# Patient Record
Sex: FEMALE | Race: WHITE | NOT HISPANIC OR LATINO | Employment: FULL TIME | URBAN - METROPOLITAN AREA
[De-identification: names, ages, dates, MRNs, and addresses within clinical notes are randomized per-mention and may not be internally consistent; named-entity substitution may affect disease eponyms.]

---

## 2018-02-07 NOTE — PROGRESS NOTES
Negrita [facility] COLLETTE [facility]CGREGOR [facility]  FAMILY PRACTICE OFFICE VISIT       NAME: Harish Garner  AGE: 50 y o  SEX: female       : 1965        MRN: [id number]    DATE: 2018  TIME: 7:01 PM    Assessment and Plan     Problem List Items Addressed This Visit     Hypertension, essential - Primary       Reviewed that her blood pressure elevated today  She states that she has been diagnosed with white coat syndrome in the past  The patient will continue to monitor her blood pressure at home  If she is getting readings over 140/90, she should give us a  For now she will continue with hydrochlorothiazide 25 milligrams daily  We will plan to reassess this in 2 months  Relevant Medications    hydrochlorothiazide (HYDRODIURIL) 25 mg tablet    Other Relevant Orders    TSH, 3rd generation with T4 reflex    Impaired fasting blood sugar       We will reassess with the labs are ordered today  She will continue with her metformin start taking it twice daily again  She will continue her carbohydrate intake  She will be able to start exercising regular basis future as well  Relevant Orders    Comprehensive metabolic panel    Lipid Panel with Direct LDL reflex    TSH, 3rd generation with T4 reflex    Hemoglobin A1c    Microalbumin, Random Urine (W/Creatinine)    Mild intermittent asthma without complication       Stable Symbicort daily as Proventil as needed         Relevant Medications    albuterol (PROVENTIL HFA,VENTOLIN HFA) 90 mcg/act inhaler    budesonide-formoterol (SYMBICORT) 80-4 5 MCG/ACT inhaler    Depression       The patient is hesitant to start medication for depression at this time  She was given a script Wellbutrin to hold onto in think about starting  She was additionally referred to Psychology  She will follow up in 2 months for recheck           Relevant Medications    buPROPion (WELLBUTRIN XL) 150 mg 24 hr tablet    Other Relevant Orders    Ambulatory referral to Psychology Gastroesophageal reflux disease without esophagitis       Controlled with pantoprazole 20 daily  She was encouraged to try to decrease her use to every other day if tolerated  Relevant Medications    pantoprazole (PROTONIX) 20 mg tablet    Other Relevant Orders    Magnesium    Ventral hernia without obstruction or gangrene       The patient a large ventral hernia the epigastric region  She was referred to Dr Monserrat Davis for correction of this  She was encouraged to avoid any lifting activities or crutches  If she develops any severe and persistent pain, she should go to the emergency room for further immediate evaluation  Relevant Orders    Ambulatory referral to General Surgery    Anemia       Previously on iron -  appears that it was likely due to menorrhagia as blood loss during her hysterectomy -  Reassess with labs as above  Relevant Orders    CBC and differential    H/O: hysterectomy      The patient has been experiencing flashes here to  She was referred to gynecology for follow-up status post her hysterectomy as as to discuss possible treatment options for these significant flashes  Relevant Orders    Ambulatory referral to Gynecology    Hot flashes      The patient has been experiencing flashes here to  She was referred to gynecology for follow-up status post her hysterectomy as as to discuss possible treatment options for these significant flashes  Gastroesophageal reflux disease without esophagitis    Controlled with pantoprazole 20 daily  She was encouraged to try to decrease her use to every other day if tolerated  Impaired fasting blood sugar    We will reassess with the labs are ordered today  She will continue with her metformin start taking it twice daily again  She will continue her carbohydrate intake  She will be able to start exercising regular basis future as well      Mild intermittent asthma without complication    Stable Symbicort daily as Proventil as needed    Hypertension, essential    Reviewed that her blood pressure elevated today  She states that she has been diagnosed with white coat syndrome in the past  The patient will continue to monitor her blood pressure at home  If she is getting readings over 140/90, she should give us a  For now she will continue with hydrochlorothiazide 25 milligrams daily  We will plan to reassess this in 2 months  Depression    The patient is hesitant to start medication for depression at this time  She was given a script Wellbutrin to hold onto in think about starting  She was additionally referred to Psychology  She will follow up in 2 months for recheck  Ventral hernia without obstruction or gangrene    The patient a large ventral hernia the epigastric region  She was referred to Dr Cristina Rios for correction of this  She was encouraged to avoid any lifting activities or crutches  If she develops any severe and persistent pain, she should go to the emergency room for further immediate evaluation  Hot flashes   The patient has been experiencing flashes here to  She was referred to gynecology for follow-up status post her hysterectomy as as to discuss possible treatment options for these significant flashes  H/O: hysterectomy   The patient has been experiencing flashes here to  She was referred to gynecology for follow-up status post her hysterectomy as as to discuss possible treatment options for these significant flashes  Anemia    Previously on iron -  appears that it was likely due to menorrhagia as blood loss during her hysterectomy -  Reassess with labs as above  Chief Complaint     Chief Complaint   Patient presents with    Establish Care     new pt     Hernia     noticed in the summer        History of Present Illness   Brady Giang is a 50y o -year-old female who presents for to establish care       The patient notes that she had a hysterectomy in 2016 and diagnosed with hernia but had no coverage  She needs referral to surgeon  She has a constant discomfort there and bulging  She does flights of stairs daily but feels this makes her short of breath  She has daily diarrhea for a few months but no blood noted  She notes that she has a lot of hot flashes and needs a GYN  She notes that she has PCOS which resulted in insulin resistance  She notes that her last A1c was 6 1% - notes that was before her metformin was started  She was on it twice daily but has only been on it once daily for the last few months  She has had no labs since 2016  She has been on HCTZ 25 mg daily for a while for HTN and usually 110/70 to 140/80 at home  She is controlled with the pantoprazole 20 mg daily - has had EGD in the past (last was 5-7 years ago) and has had gastritis and ulcers    She notes that she often is anemic  She was taking iron and vitamin C and felt amazing  She ran out of Colace so had to stop it about 10 months  She has asthma and uses Symbicort once daily (more in the summer) and Proventil about 1-3 times a month  Patient also notes that she has had a very rough past year  She states that she had put her cat down shortly after having hysterectomy  Then her mother   About 6 months later her father   Then she also to put down her other cat  She has been struggling with depression and having some suicidal thoughts  She does not feel that she would act on these thoughts it is even bothering her where she is dreaming about it  He is been antidepressants she trouble serotonin syndrome Lexapro  She does not want to take any medications that affect serotonin or any controlled substances such as Xanax  Review of Systems   Review of Systems   Respiratory: Negative for cough, shortness of breath and wheezing  Cardiovascular: Negative for chest pain, palpitations and leg swelling  Gastrointestinal: Positive for abdominal pain  Negative for constipation, diarrhea, nausea and vomiting  No heartburn   Endocrine:        Hot flashes   Genitourinary: Negative for dysuria  Musculoskeletal: Negative for arthralgias and myalgias  Skin: Negative for rash  Neurological: Negative for dizziness and headaches  Hematological: Does not bruise/bleed easily  Psychiatric/Behavioral: Positive for dysphoric mood and suicidal ideas (but no plan or intent)  The patient is nervous/anxious  Active Problem List     Patient Active Problem List   Diagnosis    Hypertension, essential    Hypokalemia    Impaired fasting blood sugar    Mild intermittent asthma without complication    Depression    Gastroesophageal reflux disease without esophagitis    Ventral hernia without obstruction or gangrene    Anemia    H/O: hysterectomy    Hot flashes         Past Medical History:  Past Medical History:   Diagnosis Date    Hx of hysterectomy        Past Surgical History:  Past Surgical History:   Procedure Laterality Date    HYSTERECTOMY      SINUS SURGERY      sinus and septum deviation correction and adenoids were removed    WISDOM TOOTH EXTRACTION         Family History:  Family History   Problem Relation Age of Onset    Hypertension Mother    [place] Stroke Mother     Depression Mother     Anxiety disorder Mother     Glaucoma Mother     Cancer Father      kidney and stomach    Hypertension Father     Depression Father     Anxiety disorder Father     Glaucoma Father     Diabetes Sister     Anxiety disorder Sister     Heart attack Brother     Hypertension Brother        Social History:  Social History     Social History    Marital status: Unknown     Spouse name: N/A    Number of children: N/A    Years of education: N/A     Occupational History    Not on file       Social History Main Topics    Smoking status: Never Smoker    Smokeless tobacco: Never Used    Alcohol use No    Drug use: No    Sexual activity: Not on file Other Topics Concern    Not on file     Social History Narrative    No narrative on file       Objective     Vitals:    02/08/18 1737   Pulse: 70   Resp: 18   Temp: 98 1 °F (36 7 °C)     Wt Readings from Last 3 Encounters:   02/08/18 81 6 kg (180 lb)       Physical Exam   Constitutional: She appears well-developed and well-nourished  HENT:   Head: Normocephalic and atraumatic  Right Ear: Hearing, tympanic membrane, external ear and ear canal normal    Left Ear: Hearing, tympanic membrane, external ear and ear canal normal    Nose: Nose normal    Mouth/Throat: Oropharynx is clear and moist and mucous membranes are normal    Eyes: Conjunctivae and lids are normal  Pupils are equal, round, and reactive to light  Neck: Trachea normal and normal range of motion  Neck supple  Carotid bruit is not present  No thyroid mass and no thyromegaly present  Cardiovascular: Normal rate, regular rhythm, S1 normal, S2 normal, normal heart sounds and intact distal pulses  No murmur heard  Pulses:       Radial pulses are 2+ on the right side, and 2+ on the left side  Posterior tibial pulses are 2+ on the right side, and 2+ on the left side  Pulmonary/Chest: Effort normal and breath sounds normal  She has no decreased breath sounds  She has no wheezes  She has no rhonchi  She has no rales  Abdominal: Soft  Normal appearance and bowel sounds are normal  She exhibits no distension and no mass  There is no hepatosplenomegaly  There is tenderness in the right lower quadrant and left upper quadrant  There is no rigidity and no guarding  A hernia is present  Hernia confirmed positive in the ventral area (13 cm x 7 cm in epigastric region)  Musculoskeletal: Normal range of motion  Lymphadenopathy:     She has no cervical adenopathy  Neurological: She is alert  She has normal strength  No sensory deficit (light touch sensation intact and equal in UE and LE bilaterally)  Skin: Skin is warm and dry   No rash noted  Psychiatric: She has a normal mood and affect  Her behavior is normal    Vitals reviewed        Orders Placed This Encounter   Procedures    CBC and differential    Comprehensive metabolic panel    Lipid Panel with Direct LDL reflex    TSH, 3rd generation with T4 reflex    Hemoglobin A1c    Microalbumin, Random Urine (W/Creatinine)    Magnesium    Ambulatory referral to Gynecology    Ambulatory referral to General Surgery    Ambulatory referral to Psychology       ALLERGIES:  Allergies   Allergen Reactions    Zolpidem Anxiety    Escitalopram Other (See Comments)     Hallucinations    Latex      Swelling, irritation    Seasonal Ic [Cholestatin]        Current Medications     Current Outpatient Prescriptions   Medication Sig Dispense Refill    albuterol (PROVENTIL HFA,VENTOLIN HFA) 90 mcg/act inhaler Inhale 2 puffs every 6 (six) hours as needed      budesonide-formoterol (SYMBICORT) 80-4 5 MCG/ACT inhaler Inhale 2 puffs 2 (two) times a day      hydrochlorothiazide (HYDRODIURIL) 25 mg tablet Take 1 tablet (25 mg total) by mouth daily 30 tablet 5    metFORMIN (GLUCOPHAGE) 500 mg tablet Take 500 mg by mouth 2 (two) times a day with meals      pantoprazole (PROTONIX) 20 mg tablet Take 1 tablet (20 mg total) by mouth daily 30 tablet 5    zinc gluconate 50 mg tablet Take 50 mg by mouth daily      acetaminophen (TYLENOL) 500 mg tablet Take 500 mg by mouth every 8 (eight) hours      Ascorbic Acid, Vitamin C, (VITAMIN C) 100 MG tablet Take 250 mg by mouth      budesonide-formoterol (SYMBICORT) 80-4 5 MCG/ACT inhaler Inhale 2 puffs      buPROPion (WELLBUTRIN XL) 150 mg 24 hr tablet Take 1 tablet (150 mg total) by mouth every morning 30 tablet 1    docusate sodium (COLACE) 100 mg capsule Take 100 mg by mouth      ferrous sulfate 325 (65 Fe) mg tablet Take 325 mg by mouth      hydrochlorothiazide (HYDRODIURIL) 25 mg tablet TAKE ONE TABLET BY MOUTH ONE TIME DAILY      LORazepam (ATIVAN) 0 5 mg tablet Take 0 5 mg by mouth every 6 (six) hours      pantoprazole (PROTONIX) 20 mg tablet TAKE ONE TABLET BY MOUTH ONE TIME DAILY      PSEUDOEPHEDRINE SULFATE PO Take by mouth      traMADol (ULTRAM) 50 mg tablet Take 50 mg by mouth every 4 (four) hours       No current facility-administered medications for this visit  Health Maintenance   There are no preventive care reminders to display for this patient    Immunization History   Administered Date(s) Administered    Pneumococcal Polysaccharide PPV23 08/04/2016       Bhupendra Farmer PA-C  2/8/2018 7:01 PM  [facility]

## 2018-02-08 ENCOUNTER — OFFICE VISIT (OUTPATIENT)
Dept: FAMILY MEDICINE CLINIC | Facility: CLINIC | Age: 53
End: 2018-02-08
Payer: COMMERCIAL

## 2018-02-08 VITALS
DIASTOLIC BLOOD PRESSURE: 92 MMHG | HEART RATE: 70 BPM | SYSTOLIC BLOOD PRESSURE: 150 MMHG | TEMPERATURE: 98.1 F | WEIGHT: 180 LBS | RESPIRATION RATE: 18 BRPM | HEIGHT: 63 IN | BODY MASS INDEX: 31.89 KG/M2

## 2018-02-08 DIAGNOSIS — Z90.710 H/O: HYSTERECTOMY: ICD-10-CM

## 2018-02-08 DIAGNOSIS — R23.2 HOT FLASHES: ICD-10-CM

## 2018-02-08 DIAGNOSIS — F32.A DEPRESSION, UNSPECIFIED DEPRESSION TYPE: ICD-10-CM

## 2018-02-08 DIAGNOSIS — K21.9 GASTROESOPHAGEAL REFLUX DISEASE WITHOUT ESOPHAGITIS: ICD-10-CM

## 2018-02-08 DIAGNOSIS — I10 HYPERTENSION, ESSENTIAL: Primary | ICD-10-CM

## 2018-02-08 DIAGNOSIS — R73.01 IMPAIRED FASTING BLOOD SUGAR: ICD-10-CM

## 2018-02-08 DIAGNOSIS — K43.9 VENTRAL HERNIA WITHOUT OBSTRUCTION OR GANGRENE: ICD-10-CM

## 2018-02-08 DIAGNOSIS — D64.9 ANEMIA, UNSPECIFIED TYPE: ICD-10-CM

## 2018-02-08 DIAGNOSIS — J45.20 MILD INTERMITTENT ASTHMA WITHOUT COMPLICATION: ICD-10-CM

## 2018-02-08 PROCEDURE — 3725F SCREEN DEPRESSION PERFORMED: CPT | Performed by: PHYSICIAN ASSISTANT

## 2018-02-08 PROCEDURE — 99214 OFFICE O/P EST MOD 30 MIN: CPT | Performed by: PHYSICIAN ASSISTANT

## 2018-02-08 RX ORDER — TRAMADOL HYDROCHLORIDE 50 MG/1
50 TABLET ORAL EVERY 4 HOURS
COMMUNITY
Start: 2016-09-14 | End: 2018-02-22

## 2018-02-08 RX ORDER — PANTOPRAZOLE SODIUM 20 MG/1
20 TABLET, DELAYED RELEASE ORAL DAILY
COMMUNITY
End: 2018-02-08

## 2018-02-08 RX ORDER — ACETAMINOPHEN 500 MG
500-1000 TABLET ORAL EVERY 6 HOURS PRN
COMMUNITY
Start: 2016-09-14 | End: 2018-06-25

## 2018-02-08 RX ORDER — FERROUS SULFATE 325(65) MG
325 TABLET ORAL
COMMUNITY
Start: 2016-09-15 | End: 2018-02-22

## 2018-02-08 RX ORDER — ZINC GLUCONATE 50 MG
50 TABLET ORAL DAILY
COMMUNITY
End: 2018-03-13

## 2018-02-08 RX ORDER — BUPROPION HYDROCHLORIDE 150 MG/1
150 TABLET ORAL EVERY MORNING
Qty: 30 TABLET | Refills: 1 | Status: SHIPPED | OUTPATIENT
Start: 2018-02-08 | End: 2018-02-22

## 2018-02-08 RX ORDER — HYDROCHLOROTHIAZIDE 25 MG/1
TABLET ORAL
COMMUNITY
Start: 2017-01-04 | End: 2018-02-16 | Stop reason: SDUPTHER

## 2018-02-08 RX ORDER — BUDESONIDE AND FORMOTEROL FUMARATE DIHYDRATE 80; 4.5 UG/1; UG/1
2 AEROSOL RESPIRATORY (INHALATION) DAILY PRN
COMMUNITY
Start: 2016-08-04

## 2018-02-08 RX ORDER — LORAZEPAM 0.5 MG/1
0.5 TABLET ORAL EVERY 6 HOURS
COMMUNITY
End: 2018-02-22

## 2018-02-08 RX ORDER — BUDESONIDE AND FORMOTEROL FUMARATE DIHYDRATE 80; 4.5 UG/1; UG/1
2 AEROSOL RESPIRATORY (INHALATION) 2 TIMES DAILY
COMMUNITY
End: 2018-02-16 | Stop reason: SDUPTHER

## 2018-02-08 RX ORDER — HYDROCHLOROTHIAZIDE 25 MG/1
25 TABLET ORAL DAILY
COMMUNITY
End: 2018-02-08

## 2018-02-08 RX ORDER — DOCUSATE SODIUM 100 MG/1
100 CAPSULE, LIQUID FILLED ORAL
COMMUNITY
Start: 2016-09-15 | End: 2018-02-22

## 2018-02-08 RX ORDER — PANTOPRAZOLE SODIUM 20 MG/1
20 TABLET, DELAYED RELEASE ORAL DAILY
Qty: 30 TABLET | Refills: 5 | Status: SHIPPED | OUTPATIENT
Start: 2018-02-08 | End: 2018-08-16 | Stop reason: SDUPTHER

## 2018-02-08 RX ORDER — HYDROCHLOROTHIAZIDE 25 MG/1
25 TABLET ORAL DAILY
Qty: 30 TABLET | Refills: 5 | Status: SHIPPED | OUTPATIENT
Start: 2018-02-08 | End: 2018-08-16 | Stop reason: SDUPTHER

## 2018-02-08 RX ORDER — ALBUTEROL SULFATE 90 UG/1
2 AEROSOL, METERED RESPIRATORY (INHALATION) EVERY 6 HOURS PRN
COMMUNITY

## 2018-02-08 RX ORDER — PANTOPRAZOLE SODIUM 20 MG/1
TABLET, DELAYED RELEASE ORAL
COMMUNITY
Start: 2017-01-04 | End: 2018-02-16 | Stop reason: SDUPTHER

## 2018-02-08 RX ORDER — METFORMIN HYDROCHLORIDE 500 MG/1
500 TABLET, FILM COATED, EXTENDED RELEASE ORAL 2 TIMES DAILY WITH MEALS
COMMUNITY
End: 2018-02-08

## 2018-02-08 NOTE — ASSESSMENT & PLAN NOTE
We will reassess with the labs are ordered today  She will continue with her metformin start taking it twice daily again  She will continue her carbohydrate intake  She will be able to start exercising regular basis future as well

## 2018-02-08 NOTE — ASSESSMENT & PLAN NOTE
Controlled with pantoprazole 20 daily  She was encouraged to try to decrease her use to every other day if tolerated

## 2018-02-08 NOTE — ASSESSMENT & PLAN NOTE
The patient is hesitant to start medication for depression at this time  She was given a script Wellbutrin to hold onto in think about starting  She was additionally referred to Psychology  She will follow up in 2 months for recheck

## 2018-02-08 NOTE — ASSESSMENT & PLAN NOTE
Reviewed that her blood pressure elevated today  She states that she has been diagnosed with white coat syndrome in the past  The patient will continue to monitor her blood pressure at home  If she is getting readings over 140/90, she should give us a  For now she will continue with hydrochlorothiazide 25 milligrams daily  We will plan to reassess this in 2 months

## 2018-02-09 NOTE — ASSESSMENT & PLAN NOTE
The patient a large ventral hernia the epigastric region  She was referred to Dr Monserrat Davis for correction of this  She was encouraged to avoid any lifting activities or crutches  If she develops any severe and persistent pain, she should go to the emergency room for further immediate evaluation

## 2018-02-09 NOTE — PATIENT INSTRUCTIONS
Gastroesophageal reflux disease without esophagitis    Controlled with pantoprazole 20 daily  She was encouraged to try to decrease her use to every other day if tolerated  Impaired fasting blood sugar    We will reassess with the labs are ordered today  She will continue with her metformin start taking it twice daily again  She will continue her carbohydrate intake  She will be able to start exercising regular basis future as well  Mild intermittent asthma without complication    Stable Symbicort daily as Proventil as needed    Hypertension, essential    Reviewed that her blood pressure elevated today  She states that she has been diagnosed with white coat syndrome in the past  The patient will continue to monitor her blood pressure at home  If she is getting readings over 140/90, she should give us a  For now she will continue with hydrochlorothiazide 25 milligrams daily  We will plan to reassess this in 2 months  Depression    The patient is hesitant to start medication for depression at this time  She was given a script Wellbutrin to hold onto in think about starting  She was additionally referred to Psychology  She will follow up in 2 months for recheck  Ventral hernia without obstruction or gangrene    The patient a large ventral hernia the epigastric region  She was referred to Dr Yaa Valdes for correction of this  She was encouraged to avoid any lifting activities or crutches  If she develops any severe and persistent pain, she should go to the emergency room for further immediate evaluation  Hot flashes   The patient has been experiencing flashes here to  She was referred to gynecology for follow-up status post her hysterectomy as as to discuss possible treatment options for these significant flashes  H/O: hysterectomy   The patient has been experiencing flashes here to    She was referred to gynecology for follow-up status post her hysterectomy as as to discuss possible treatment options for these significant flashes  Anemia    Previously on iron -  appears that it was likely due to menorrhagia as blood loss during her hysterectomy -  Reassess with labs as above

## 2018-02-09 NOTE — ASSESSMENT & PLAN NOTE
The patient has been experiencing flashes here to  She was referred to gynecology for follow-up status post her hysterectomy as as to discuss possible treatment options for these significant flashes

## 2018-02-09 NOTE — ASSESSMENT & PLAN NOTE
Previously on iron -  appears that it was likely due to menorrhagia as blood loss during her hysterectomy -  Reassess with labs as above

## 2018-02-16 ENCOUNTER — OFFICE VISIT (OUTPATIENT)
Dept: SURGERY | Facility: CLINIC | Age: 53
End: 2018-02-16
Payer: COMMERCIAL

## 2018-02-16 VITALS
BODY MASS INDEX: 31.71 KG/M2 | RESPIRATION RATE: 18 BRPM | HEART RATE: 72 BPM | DIASTOLIC BLOOD PRESSURE: 78 MMHG | SYSTOLIC BLOOD PRESSURE: 142 MMHG | WEIGHT: 179 LBS | HEIGHT: 63 IN | TEMPERATURE: 98.2 F

## 2018-02-16 DIAGNOSIS — K43.9 VENTRAL HERNIA WITHOUT OBSTRUCTION OR GANGRENE: ICD-10-CM

## 2018-02-16 DIAGNOSIS — K43.2 INCISIONAL HERNIA, WITHOUT OBSTRUCTION OR GANGRENE: Primary | ICD-10-CM

## 2018-02-16 PROCEDURE — 99204 OFFICE O/P NEW MOD 45 MIN: CPT | Performed by: SURGERY

## 2018-02-16 NOTE — LETTER
February 16, 2018     Neida Anthony PA-C  [address]  [facility]  [address]    Patient: Brady Giang   YOB: 1965   Date of Visit: 2/16/2018       Dear Dr Meenakshi Shetty: Thank you for referring Brady Giang to me for evaluation  Below are my notes for this consultation  If you have questions, please do not hesitate to call me  I look forward to following your patient along with you  Sincerely,        Ruslan Brooks MD        CC: MD Zabrina Estrella PA-C  2/16/2018  8:46 AM  Cosign Needed  Assessment/Plan:   Brady Giang is a 50 y  o female who is here for The encounter diagnosis was Ventral hernia without obstruction or gangrene  Incisional Hernia    82-year-old female status post total hysterectomy approximately year ago with bulge above incision and pain associated with this  Denies change in bowel habits but alternates between diarrhea and constipation    On physical exam first there is a ventral hernia above the umbilicus and previous scar  Plan: laparoscopic repair of Incisional Ventral Hernia  Will obtain a CT scan of abdomen pelvis  Discussed need for screening colonoscopy        Preoperative Clearance: none          Imaging: No new pertinent imaging studies  Patient was never admitted  _____________________________________________      HPI:  Brady Giang is a 50 y  o female who was referred for evaluation of Hernia (ventral)        Currently complaining of initial Incisional Ventral Hernia  worse with bending, lifting, standing, walking,   no nausea and no vomiting,  Bowel habits fluctuate between constipation and diarrhea  Constant and over 6 months      Prior abdominal surgery: total hysterectomy        ROS:  General ROS: negative  negative for - chills, fatigue, fever or night sweats, weight loss  Respiratory ROS: no cough, shortness of breath, or wheezing  Cardiovascular ROS: no chest pain or dyspnea on exertion  Genito-Urinary ROS: no dysuria, trouble voiding, or hematuria  Musculoskeletal ROS: negative for - gait disturbance, joint pain or muscle pain  Neurological ROS: no TIA or stroke symptoms  Abdominal ROS: see HPI  GI ROS: see HPI  Skin ROS: no new rashes or lesions   Lymphatic ROS: no new adenopathy noted by pt     GYN ROS: see HPI, no new GYN history or bleeding noted  Psy ROS: no new mental or behavioral disturbances       Patient Active Problem List   Diagnosis    Hypertension, essential    Hypokalemia    Impaired fasting blood sugar    Mild intermittent asthma without complication    Depression    Gastroesophageal reflux disease without esophagitis    Ventral hernia without obstruction or gangrene    Anemia    H/O: hysterectomy    Hot flashes         Allergies: Zolpidem; Escitalopram; Latex; and Seasonal ic [cholestatin]    Meds:  Current Outpatient Prescriptions:     albuterol (PROVENTIL HFA,VENTOLIN HFA) 90 mcg/act inhaler, Inhale 2 puffs every 6 (six) hours as needed, Disp: , Rfl:     budesonide-formoterol (SYMBICORT) 80-4 5 MCG/ACT inhaler, Inhale 2 puffs, Disp: , Rfl:     hydrochlorothiazide (HYDRODIURIL) 25 mg tablet, Take 1 tablet (25 mg total) by mouth daily, Disp: 30 tablet, Rfl: 5    metFORMIN (GLUCOPHAGE) 500 mg tablet, Take 500 mg by mouth 2 (two) times a day with meals, Disp: , Rfl:     pantoprazole (PROTONIX) 20 mg tablet, Take 1 tablet (20 mg total) by mouth daily, Disp: 30 tablet, Rfl: 5    zinc gluconate 50 mg tablet, Take 50 mg by mouth daily, Disp: , Rfl:     acetaminophen (TYLENOL) 500 mg tablet, Take 500 mg by mouth every 8 (eight) hours, Disp: , Rfl:     Ascorbic Acid, Vitamin C, (VITAMIN C) 100 MG tablet, Take 250 mg by mouth, Disp: , Rfl:     buPROPion (WELLBUTRIN XL) 150 mg 24 hr tablet, Take 1 tablet (150 mg total) by mouth every morning, Disp: 30 tablet, Rfl: 1    docusate sodium (COLACE) 100 mg capsule, Take 100 mg by mouth, Disp: , Rfl:     ferrous sulfate 325 (65 Fe) mg tablet, Take 325 mg by mouth, Disp: , Rfl:     LORazepam (ATIVAN) 0 5 mg tablet, Take 0 5 mg by mouth every 6 (six) hours, Disp: , Rfl:     PSEUDOEPHEDRINE SULFATE PO, Take by mouth, Disp: , Rfl:     traMADol (ULTRAM) 50 mg tablet, Take 50 mg by mouth every 4 (four) hours, Disp: , Rfl:     PMH:  Past Medical History:   Diagnosis Date    [place]     Asthma     Depression     Diabetes ([address] )     GERD (gastroesophageal reflux disease)     Hx of hysterectomy     Hypertension        PSH:  Past Surgical History:   Procedure Laterality Date    HYSTERECTOMY      SINUS SURGERY      sinus and septum deviation correction and adenoids were removed    WISDOM TOOTH EXTRACTION         Family History   Problem Relation Age of Onset    Hypertension Mother     Stroke Mother     Depression Mother     Anxiety disorder Mother     Glaucoma Mother     Cancer Father      kidney and stomach    Hypertension Father     Depression Father     Anxiety disorder Father     Glaucoma Father     Diabetes Sister     Anxiety disorder Sister     Heart attack Brother     Hypertension Brother         reports that she has never smoked  She has never used smokeless tobacco  She reports that she does not drink alcohol or use drugs  PHYSICAL EXAM  General Appearance:    Alert, cooperative, no distress, NAD   Head:    Normocephalic without obvious abnormality   Eyes:    PERRL, conjunctiva/corneas clear, EOM's intact        Neck:   Supple, no adenopathy, no JVD   Back:     Symmetric, no spinal or CVA tenderness   Lungs:     Clear to auscultation bilaterally, no wheezing or rhonchi   Heart:    Regular rate and rhythm, S1 and S2 normal, no murmur   Abdomen:      moderate tenderness in the supraumbilical  Bowel sounds are normal     Ventral Incisional Hernia  The hernia is, easily reducible,, The fascial edges are easily palpable      Extremities:   Extremities normal  No clubbing, cyanosis or edema   Psych: Normal Affect, AOx3  Neurologic:  Skin:   CNII-XII intact  Strength symmetric, speech intact    Warm, dry, intact, no visible rashes or lesions                   Informed consent for procedure was personally discussed, reviewed, and signed by Dr Adina Herbert  Discussion by Dr Adina Herbert was carried out regarding risks, benefits, and alternatives with the patient  Risks include but are not limited to:  bleeding, infection, and delayed wound healing or an open wound, pulmonary embolus, leaks from bowel or bile ducts or other viscus, transfusions, death  Discussed in further detail the more common complications and their rates of occurrence   was used if necessary  Patient expressed understanding of the issues discussed and wished/consented to proceed  All questions were answered by Dr Adina Herbert  Discussion performed between patient and the provider signing below  Signature:   Charmayne Honey, MD    Date: 2/16/2018 Time: 8:35 AM       Some portions of this record may have been generated with voice recognition software  There may be translation, syntax,  or grammatical errors  Occasional wrong word or "sound-a-like" substitutions may have occurred due to the inherent limitations of the voice recognition software  Read the chart carefully and recognize, using context, where substitutions may have occurred  If you have any questions, please contact the dictating provider for clarification or correction, as needed  This encounter has been coded by a non-certified coder

## 2018-02-16 NOTE — PROGRESS NOTES
Assessment/Plan:   Edmond Kulkarni is a 50 y  o female who is here for The encounter diagnosis was Ventral hernia without obstruction or gangrene  Incisional Hernia    53-year-old female status post total hysterectomy approximately year ago with bulge above incision and pain associated with this  Denies change in bowel habits but alternates between diarrhea and constipation    On physical exam first there is a ventral hernia above the umbilicus and previous scar  Plan: laparoscopic repair of Incisional Ventral Hernia  Will obtain a CT scan of abdomen pelvis  Discussed need for screening colonoscopy        Preoperative Clearance: none          Imaging: No new pertinent imaging studies  Patient was never admitted  _____________________________________________      HPI:  Edmond Kulkarni is a 50 y  o female who was referred for evaluation of Hernia (ventral)    Currently complaining of initial Incisional Ventral Hernia  worse with bending, lifting, standing, walking,   no nausea and no vomiting,  Bowel habits fluctuate between constipation and diarrhea  Constant and over 6 months      Prior abdominal surgery: total hysterectomy        ROS:  General ROS: negative  negative for - chills, fatigue, fever or night sweats, weight loss  Respiratory ROS: no cough, shortness of breath, or wheezing  Cardiovascular ROS: no chest pain or dyspnea on exertion  Genito-Urinary ROS: no dysuria, trouble voiding, or hematuria  Musculoskeletal ROS: negative for - gait disturbance, joint pain or muscle pain  Neurological ROS: no TIA or stroke symptoms  Abdominal ROS: see HPI  GI ROS: see HPI  Skin ROS: no new rashes or lesions   Lymphatic ROS: no new adenopathy noted by pt     GYN ROS: see HPI, no new GYN history or bleeding noted  Psy ROS: no new mental or behavioral disturbances       Patient Active Problem List   Diagnosis    Hypertension, essential    Hypokalemia    Impaired fasting blood sugar    Mild intermittent asthma without complication    Depression    Gastroesophageal reflux disease without esophagitis    Ventral hernia without obstruction or gangrene    Anemia    H/O: hysterectomy    Hot flashes         Allergies: Zolpidem; Escitalopram; Latex; and Seasonal ic [cholestatin]    Meds:  Current Outpatient Prescriptions:     albuterol (PROVENTIL HFA,VENTOLIN HFA) 90 mcg/act inhaler, Inhale 2 puffs every 6 (six) hours as needed, Disp: , Rfl:     budesonide-formoterol (SYMBICORT) 80-4 5 MCG/ACT inhaler, Inhale 2 puffs, Disp: , Rfl:     hydrochlorothiazide (HYDRODIURIL) 25 mg tablet, Take 1 tablet (25 mg total) by mouth daily, Disp: 30 tablet, Rfl: 5    metFORMIN (GLUCOPHAGE) 500 mg tablet, Take 500 mg by mouth 2 (two) times a day with meals, Disp: , Rfl:     pantoprazole (PROTONIX) 20 mg tablet, Take 1 tablet (20 mg total) by mouth daily, Disp: 30 tablet, Rfl: 5    zinc gluconate 50 mg tablet, Take 50 mg by mouth daily, Disp: , Rfl:     acetaminophen (TYLENOL) 500 mg tablet, Take 500 mg by mouth every 8 (eight) hours, Disp: , Rfl:     Ascorbic Acid, Vitamin C, (VITAMIN C) 100 MG tablet, Take 250 mg by mouth, Disp: , Rfl:     buPROPion (WELLBUTRIN XL) 150 mg 24 hr tablet, Take 1 tablet (150 mg total) by mouth every morning, Disp: 30 tablet, Rfl: 1    docusate sodium (COLACE) 100 mg capsule, Take 100 mg by mouth, Disp: , Rfl:     ferrous sulfate 325 (65 Fe) mg tablet, Take 325 mg by mouth, Disp: , Rfl:     LORazepam (ATIVAN) 0 5 mg tablet, Take 0 5 mg by mouth every 6 (six) hours, Disp: , Rfl:     PSEUDOEPHEDRINE SULFATE PO, Take by mouth, Disp: , Rfl:     traMADol (ULTRAM) 50 mg tablet, Take 50 mg by mouth every 4 (four) hours, Disp: , Rfl:     PMH:  Past Medical History:   Diagnosis Date    [place]     Asthma     Depression     Diabetes ([facility] [address]     GERD (gastroesophageal reflux disease)     Hx of hysterectomy     Hypertension        PSH:  Past Surgical History:   Procedure Laterality Date    HYSTERECTOMY      SINUS SURGERY      sinus and septum deviation correction and adenoids were removed    WISDOM TOOTH EXTRACTION         Family History   Problem Relation Age of Onset    Hypertension Mother     Stroke Mother     Depression Mother     Anxiety disorder Mother     Glaucoma Mother     Cancer Father      kidney and stomach    Hypertension Father     Depression Father     Anxiety disorder Father     Glaucoma Father     Diabetes Sister     Anxiety disorder Sister     Heart attack Brother     Hypertension Brother         reports that she has never smoked  She has never used smokeless tobacco  She reports that she does not drink alcohol or use drugs  PHYSICAL EXAM  General Appearance:    Alert, cooperative, no distress, NAD   Head:    Normocephalic without obvious abnormality   Eyes:    PERRL, conjunctiva/corneas clear, EOM's intact        Neck:   Supple, no adenopathy, no JVD   Back:     Symmetric, no spinal or CVA tenderness   Lungs:     Clear to auscultation bilaterally, no wheezing or rhonchi   Heart:    Regular rate and rhythm, S1 and S2 normal, no murmur   Abdomen:      moderate tenderness in the supraumbilical  Bowel sounds are normal     Ventral Incisional Hernia  The hernia is, easily reducible,, The fascial edges are easily palpable   Extremities:   Extremities normal  No clubbing, cyanosis or edema   Psych:   Normal Affect, AOx3  Neurologic:  Skin:   CNII-XII intact  Strength symmetric, speech intact    Warm, dry, intact, no visible rashes or lesions                   Informed consent for procedure was personally discussed, reviewed, and signed by Dr Taylor Corporal  Discussion by Dr Taylor Corporal was carried out regarding risks, benefits, and alternatives with the patient  Risks include but are not limited to:  bleeding, infection, and delayed wound healing or an open wound, pulmonary embolus, leaks from bowel or bile ducts or other viscus, transfusions, death    Discussed in further detail the more common complications and their rates of occurrence   was used if necessary  Patient expressed understanding of the issues discussed and wished/consented to proceed  All questions were answered by Dr Jay Gong  Discussion performed between patient and the provider signing below  Signature:   Sarah Ray MD    Date: 2/16/2018 Time: 8:35 AM       Some portions of this record may have been generated with voice recognition software  There may be translation, syntax,  or grammatical errors  Occasional wrong word or "sound-a-like" substitutions may have occurred due to the inherent limitations of the voice recognition software  Read the chart carefully and recognize, using context, where substitutions may have occurred  If you have any questions, please contact the dictating provider for clarification or correction, as needed  This encounter has been coded by a non-certified coder

## 2018-02-22 ENCOUNTER — OFFICE VISIT (OUTPATIENT)
Dept: FAMILY MEDICINE CLINIC | Facility: CLINIC | Age: 53
End: 2018-02-22
Payer: COMMERCIAL

## 2018-02-22 VITALS
SYSTOLIC BLOOD PRESSURE: 138 MMHG | WEIGHT: 176.13 LBS | DIASTOLIC BLOOD PRESSURE: 90 MMHG | OXYGEN SATURATION: 99 % | TEMPERATURE: 98.2 F | BODY MASS INDEX: 31.21 KG/M2 | HEIGHT: 63 IN | HEART RATE: 92 BPM

## 2018-02-22 DIAGNOSIS — J06.9 VIRAL UPPER RESPIRATORY TRACT INFECTION: Primary | ICD-10-CM

## 2018-02-22 DIAGNOSIS — R73.01 IMPAIRED FASTING BLOOD SUGAR: ICD-10-CM

## 2018-02-22 PROCEDURE — 99213 OFFICE O/P EST LOW 20 MIN: CPT | Performed by: PHYSICIAN ASSISTANT

## 2018-02-22 RX ORDER — AZELASTINE 1 MG/ML
1 SPRAY, METERED NASAL 2 TIMES DAILY
Qty: 30 ML | Refills: 0 | Status: SHIPPED | OUTPATIENT
Start: 2018-02-22 | End: 2018-03-13

## 2018-02-22 NOTE — ASSESSMENT & PLAN NOTE
Stable - with A1c of 6 1% - will continue with the metformin twice daily - referred to dietitian as well

## 2018-02-22 NOTE — PROGRESS NOTES
[facility]  FAMILY PRACTICE ACUTE OFFICE VISIT       NAME: Josiah Pop  AGE: 50 y o  SEX: female       : 1965        MRN: [id number]    DATE: 2018  TIME: 11:54 PM    Assessment and Plan     Problem List Items Addressed This Visit     Impaired fasting blood sugar     Stable - with A1c of 6 1% - will continue with the metformin twice daily - referred to dietitian as well         Relevant Orders    Ambulatory referral to Nutrition Services      Other Visit Diagnoses     Viral upper respiratory tract infection    -  Primary    encouraged to try Astepro twice daily - continue netipot - increase fluids and rest - call if worsens or fails to improve over the rest of the week    Relevant Medications    azelastine (ASTELIN) 0 1 % nasal spray          Impaired fasting blood sugar  Stable - with A1c of 6 1% - will continue with the metformin twice daily - referred to dietitian as well          Chief Complaint     Chief Complaint   Patient presents with    Sinusitis     sinus pressure for 2 days        History of Present Illness   Josiah Pop is a 50y o -year-old female who presents for concern for possible bronchitis  She also notes that she had labs recently  Results were all normal besides an A1c of 6 1%  She states this is stable with her previous A1c done with last PCP  URI    This is a new problem  Episode onset: began about 3 weeks ago with occasional cough but over the last few days is progressing  The problem has been gradually worsening  There has been no fever (but was sweating most of yesterday)  Associated symptoms include congestion, coughing (productive), diarrhea, headaches, nausea (more than her usual queasy), a plugged ear sensation, rhinorrhea (clear) and sinus pain  Pertinent negatives include no sore throat, vomiting or wheezing  Treatments tried: Neti pot, has not been using the albuterol inhaler at all lately  The treatment provided mild relief  Review of Systems   Review of Systems   Constitutional: Positive for fatigue (trouble sleeping with the congestion and head pressure)  HENT: Positive for congestion, rhinorrhea (clear) and sinus pain  Negative for sore throat  Respiratory: Positive for cough (productive) and chest tightness  Negative for shortness of breath and wheezing  Gastrointestinal: Positive for diarrhea and nausea (more than her usual queasy)  Negative for vomiting  Musculoskeletal: Negative for myalgias  Neurological: Positive for headaches  Active Problem List     Patient Active Problem List   Diagnosis    Hypertension, essential    Hypokalemia    Impaired fasting blood sugar    Mild intermittent asthma without complication    Depression    Gastroesophageal reflux disease without esophagitis    Ventral hernia without obstruction or gangrene    Anemia    H/O: hysterectomy    Hot flashes         Social History:  Social History     Social History    Marital status: Unknown     Spouse name: N/A    Number of children: N/A    Years of education: N/A     Occupational History    Not on file  Social History Main Topics    Smoking status: Former Smoker     Types: Cigarettes     Quit date: 2002    Smokeless tobacco: Never Used    Alcohol use No    Drug use: No    Sexual activity: Not on file     Other Topics Concern    Not on file     Social History Narrative    No narrative on file       Objective     Vitals:    02/22/18 1001   BP: 138/90   Pulse: 92   Temp: 98 2 °F (36 8 °C)   SpO2: 99%     Wt Readings from Last 3 Encounters:   02/22/18 79 9 kg (176 lb 2 oz)   02/16/18 81 2 kg (179 lb)   02/08/18 81 6 kg (180 lb)       Physical Exam   Constitutional: She appears well-developed and well-nourished  HENT:   Head: Normocephalic and atraumatic     Right Ear: Tympanic membrane, external ear and ear canal normal    Left Ear: Tympanic membrane, external ear and ear canal normal    Nose: Mucosal edema present  Right sinus exhibits no maxillary sinus tenderness and no frontal sinus tenderness  Left sinus exhibits no maxillary sinus tenderness and no frontal sinus tenderness  Mouth/Throat: Oropharynx is clear and moist and mucous membranes are normal    Eyes: Conjunctivae and lids are normal  Pupils are equal, round, and reactive to light  Neck: Trachea normal and normal range of motion  Neck supple  No thyromegaly present  Cardiovascular: Normal rate, regular rhythm and normal heart sounds  No murmur heard  Pulses:       Radial pulses are 2+ on the right side, and 2+ on the left side  Pulmonary/Chest: Effort normal and breath sounds normal  No respiratory distress  She has no decreased breath sounds  She has no wheezes  She has no rhonchi  She has no rales  Lymphadenopathy:     She has no cervical adenopathy  Neurological: She is alert  Skin: No rash noted  Psychiatric: She has a normal mood and affect           Orders Placed This Encounter   Procedures    Ambulatory referral to Nutrition Services       ALLERGIES:  Allergies   Allergen Reactions    Zolpidem Anxiety    Escitalopram Other (See Comments)     Hallucinations    Latex      Swelling, irritation    Seasonal Ic [Cholestatin]        Current Medications     Current Outpatient Prescriptions   Medication Sig Dispense Refill    albuterol (PROVENTIL HFA,VENTOLIN HFA) 90 mcg/act inhaler Inhale 2 puffs every 6 (six) hours as needed      budesonide-formoterol (SYMBICORT) 80-4 5 MCG/ACT inhaler Inhale 2 puffs daily        hydrochlorothiazide (HYDRODIURIL) 25 mg tablet Take 1 tablet (25 mg total) by mouth daily 30 tablet 5    metFORMIN (GLUCOPHAGE) 500 mg tablet Take 500 mg by mouth 2 (two) times a day with meals      pantoprazole (PROTONIX) 20 mg tablet Take 1 tablet (20 mg total) by mouth daily 30 tablet 5    zinc gluconate 50 mg tablet Take 50 mg by mouth daily      acetaminophen (TYLENOL) 500 mg tablet Take 500 mg by mouth every 8 (eight) hours      azelastine (ASTELIN) 0 1 % nasal spray 1 spray into each nostril 2 (two) times a day Use in each nostril as directed 30 mL 0     No current facility-administered medications for this visit            Charlie Mccarty PA-C  2/22/2018 11:54 PM  [facility]

## 2018-02-22 NOTE — PATIENT INSTRUCTIONS
Problem List Items Addressed This Visit     Impaired fasting blood sugar     Stable - with A1c of 6 1% - will continue with the metformin twice daily - referred to dietitian as well         Relevant Orders    Ambulatory referral to Nutrition Services      Other Visit Diagnoses     Viral upper respiratory tract infection    -  Primary    encouraged to try Astepro twice daily - continue netipot - increase fluids and rest - call if worsens or fails to improve over the rest of the week    Relevant Medications    azelastine (ASTELIN) 0 1 % nasal spray

## 2018-02-23 PROBLEM — K43.2 INCISIONAL HERNIA, WITHOUT OBSTRUCTION OR GANGRENE: Status: ACTIVE | Noted: 2018-02-23

## 2018-02-26 ENCOUNTER — TELEPHONE (OUTPATIENT)
Dept: FAMILY MEDICINE CLINIC | Facility: CLINIC | Age: 53
End: 2018-02-26

## 2018-03-01 DIAGNOSIS — J06.9 ACUTE URI: Primary | ICD-10-CM

## 2018-03-01 RX ORDER — AMOXICILLIN 875 MG/1
875 TABLET, COATED ORAL 2 TIMES DAILY
Qty: 20 TABLET | Refills: 0 | Status: SHIPPED | OUTPATIENT
Start: 2018-03-01 | End: 2018-03-11

## 2018-03-02 ENCOUNTER — TELEPHONE (OUTPATIENT)
Dept: FAMILY MEDICINE CLINIC | Facility: CLINIC | Age: 53
End: 2018-03-02

## 2018-03-02 DIAGNOSIS — J06.9 UPPER RESPIRATORY TRACT INFECTION, UNSPECIFIED TYPE: Primary | ICD-10-CM

## 2018-03-02 RX ORDER — AZITHROMYCIN 250 MG/1
TABLET, FILM COATED ORAL
Qty: 6 TABLET | Refills: 0 | Status: SHIPPED | OUTPATIENT
Start: 2018-03-02 | End: 2018-03-06

## 2018-03-02 NOTE — TELEPHONE ENCOUNTER
Pt called this morning and states that she has a lot of GI issues with amoxicillin and would like to know if she can have a z-stephan instead as she has used this in the past for similar symptoms

## 2018-03-06 DIAGNOSIS — K43.9 HERNIA OF ABDOMINAL WALL: Primary | ICD-10-CM

## 2018-03-08 DIAGNOSIS — R10.9 ABDOMINAL PAIN, UNSPECIFIED ABDOMINAL LOCATION: Primary | ICD-10-CM

## 2018-03-10 ENCOUNTER — HOSPITAL ENCOUNTER (OUTPATIENT)
Dept: CT IMAGING | Facility: HOSPITAL | Age: 53
Discharge: HOME | End: 2018-03-10
Attending: SURGERY
Payer: COMMERCIAL

## 2018-03-10 DIAGNOSIS — K43.2 INCISIONAL HERNIA, WITHOUT OBSTRUCTION OR GANGRENE: ICD-10-CM

## 2018-03-10 PROCEDURE — 74177 CT ABD & PELVIS W/CONTRAST: CPT

## 2018-03-10 RX ADMIN — IOHEXOL 100 ML: 350 INJECTION, SOLUTION INTRAVENOUS at 13:59

## 2018-03-13 ENCOUNTER — ANESTHESIA EVENT (OUTPATIENT)
Dept: PERIOP | Facility: HOSPITAL | Age: 53
End: 2018-03-13
Payer: COMMERCIAL

## 2018-03-13 NOTE — PRE-PROCEDURE INSTRUCTIONS
Pre-Surgery Instructions:   Medication Instructions    acetaminophen (TYLENOL) 500 mg tablet Instructed patient per Anesthesia Guidelines   albuterol (PROVENTIL HFA,VENTOLIN HFA) 90 mcg/act inhaler Instructed patient per Anesthesia Guidelines   budesonide-formoterol (SYMBICORT) 80-4 5 MCG/ACT inhaler Instructed patient per Anesthesia Guidelines   hydrochlorothiazide (HYDRODIURIL) 25 mg tablet Instructed patient per Anesthesia Guidelines   metFORMIN (GLUCOPHAGE) 500 mg tablet Instructed patient per Anesthesia Guidelines   pantoprazole (PROTONIX) 20 mg tablet Instructed patient per Anesthesia Guidelines   VITAMIN E PO Patient was instructed by Physician and understands   ZINC GLUCONATE PO Instructed patient per Anesthesia Guidelines  Instructed to take pantoprazole am ofs urgery and use symicort and albuterol inhaler am ofs urgery per anesthesia DR NIÑO

## 2018-03-14 ENCOUNTER — HOSPITAL ENCOUNTER (OUTPATIENT)
Facility: HOSPITAL | Age: 53
Setting detail: OUTPATIENT SURGERY
Discharge: HOME | End: 2018-03-14
Attending: SURGERY | Admitting: SURGERY
Payer: COMMERCIAL

## 2018-03-14 ENCOUNTER — ANESTHESIA (OUTPATIENT)
Dept: PERIOP | Facility: HOSPITAL | Age: 53
End: 2018-03-14
Payer: COMMERCIAL

## 2018-03-14 VITALS
DIASTOLIC BLOOD PRESSURE: 74 MMHG | HEIGHT: 62 IN | BODY MASS INDEX: 32.39 KG/M2 | RESPIRATION RATE: 18 BRPM | SYSTOLIC BLOOD PRESSURE: 159 MMHG | HEART RATE: 86 BPM | TEMPERATURE: 99 F | WEIGHT: 176 LBS | OXYGEN SATURATION: 98 %

## 2018-03-14 DIAGNOSIS — K43.9 VENTRAL HERNIA WITHOUT OBSTRUCTION OR GANGRENE: ICD-10-CM

## 2018-03-14 DIAGNOSIS — K43.2 INCISIONAL HERNIA, WITHOUT OBSTRUCTION OR GANGRENE: Primary | ICD-10-CM

## 2018-03-14 PROCEDURE — C1781 MESH (IMPLANTABLE): HCPCS | Performed by: SURGERY

## 2018-03-14 PROCEDURE — 49654 PR LAP, INCISIONAL HERNIA REPAIR,REDUCIBLE: CPT | Performed by: SURGERY

## 2018-03-14 PROCEDURE — 88302 TISSUE EXAM BY PATHOLOGIST: CPT | Performed by: PATHOLOGY

## 2018-03-14 PROCEDURE — 49654 PR LAP, INCISIONAL HERNIA REPAIR,REDUCIBLE: CPT | Performed by: PHYSICIAN ASSISTANT

## 2018-03-14 DEVICE — BARD COMPOSIX L/P MESH
Type: IMPLANTABLE DEVICE | Site: ABDOMEN | Status: FUNCTIONAL
Brand: BARD COMPOSIX L/P MESH

## 2018-03-14 RX ORDER — ONDANSETRON 2 MG/ML
INJECTION INTRAMUSCULAR; INTRAVENOUS AS NEEDED
Status: DISCONTINUED | OUTPATIENT
Start: 2018-03-14 | End: 2018-03-14 | Stop reason: SURG

## 2018-03-14 RX ORDER — PROPOFOL 10 MG/ML
INJECTION, EMULSION INTRAVENOUS AS NEEDED
Status: DISCONTINUED | OUTPATIENT
Start: 2018-03-14 | End: 2018-03-14 | Stop reason: SURG

## 2018-03-14 RX ORDER — DEXTROSE AND SODIUM CHLORIDE 5; .45 G/100ML; G/100ML
80 INJECTION, SOLUTION INTRAVENOUS CONTINUOUS
Status: DISCONTINUED | OUTPATIENT
Start: 2018-03-14 | End: 2018-03-14 | Stop reason: HOSPADM

## 2018-03-14 RX ORDER — HYDROCODONE BITARTRATE AND ACETAMINOPHEN 5; 325 MG/1; MG/1
1 TABLET ORAL EVERY 4 HOURS PRN
Status: DISCONTINUED | OUTPATIENT
Start: 2018-03-14 | End: 2018-03-14 | Stop reason: HOSPADM

## 2018-03-14 RX ORDER — ACETAMINOPHEN 325 MG/1
650 TABLET ORAL EVERY 6 HOURS PRN
Status: DISCONTINUED | OUTPATIENT
Start: 2018-03-14 | End: 2018-03-14 | Stop reason: HOSPADM

## 2018-03-14 RX ORDER — PROMETHAZINE HYDROCHLORIDE 25 MG/ML
12.5 INJECTION, SOLUTION INTRAMUSCULAR; INTRAVENOUS EVERY 4 HOURS PRN
Status: DISCONTINUED | OUTPATIENT
Start: 2018-03-14 | End: 2018-03-14 | Stop reason: HOSPADM

## 2018-03-14 RX ORDER — SCOLOPAMINE TRANSDERMAL SYSTEM 1 MG/1
1 PATCH, EXTENDED RELEASE TRANSDERMAL ONCE AS NEEDED
Status: DISCONTINUED | OUTPATIENT
Start: 2018-03-14 | End: 2018-03-14 | Stop reason: HOSPADM

## 2018-03-14 RX ORDER — GLYCOPYRROLATE 0.2 MG/ML
INJECTION INTRAMUSCULAR; INTRAVENOUS AS NEEDED
Status: DISCONTINUED | OUTPATIENT
Start: 2018-03-14 | End: 2018-03-14 | Stop reason: SURG

## 2018-03-14 RX ORDER — ONDANSETRON 4 MG/1
4 TABLET, FILM COATED ORAL EVERY 8 HOURS PRN
Qty: 20 TABLET | Refills: 0 | Status: SHIPPED | OUTPATIENT
Start: 2018-03-14 | End: 2019-12-11 | Stop reason: ALTCHOICE

## 2018-03-14 RX ORDER — LORAZEPAM 2 MG/ML
0.5 INJECTION INTRAMUSCULAR ONCE
Status: COMPLETED | OUTPATIENT
Start: 2018-03-14 | End: 2018-03-14

## 2018-03-14 RX ORDER — MORPHINE SULFATE 2 MG/ML
2 INJECTION, SOLUTION INTRAMUSCULAR; INTRAVENOUS EVERY 2 HOUR PRN
Status: DISCONTINUED | OUTPATIENT
Start: 2018-03-14 | End: 2018-03-14 | Stop reason: HOSPADM

## 2018-03-14 RX ORDER — MIDAZOLAM HYDROCHLORIDE 1 MG/ML
INJECTION INTRAMUSCULAR; INTRAVENOUS AS NEEDED
Status: DISCONTINUED | OUTPATIENT
Start: 2018-03-14 | End: 2018-03-14 | Stop reason: SURG

## 2018-03-14 RX ORDER — ROCURONIUM BROMIDE 10 MG/ML
INJECTION, SOLUTION INTRAVENOUS AS NEEDED
Status: DISCONTINUED | OUTPATIENT
Start: 2018-03-14 | End: 2018-03-14 | Stop reason: SURG

## 2018-03-14 RX ORDER — DOCUSATE SODIUM 100 MG/1
100 CAPSULE, LIQUID FILLED ORAL 2 TIMES DAILY
Qty: 60 CAPSULE | Refills: 0 | Status: SHIPPED | OUTPATIENT
Start: 2018-03-14 | End: 2019-06-20 | Stop reason: ALTCHOICE

## 2018-03-14 RX ORDER — ONDANSETRON 4 MG/1
4 TABLET, ORALLY DISINTEGRATING ORAL EVERY 6 HOURS SCHEDULED
Status: DISCONTINUED | OUTPATIENT
Start: 2018-03-14 | End: 2018-03-14 | Stop reason: HOSPADM

## 2018-03-14 RX ORDER — FENTANYL CITRATE 50 UG/ML
50 INJECTION, SOLUTION INTRAMUSCULAR; INTRAVENOUS
Status: DISCONTINUED | OUTPATIENT
Start: 2018-03-14 | End: 2018-03-14 | Stop reason: HOSPADM

## 2018-03-14 RX ORDER — FENTANYL CITRATE 50 UG/ML
INJECTION, SOLUTION INTRAMUSCULAR; INTRAVENOUS AS NEEDED
Status: DISCONTINUED | OUTPATIENT
Start: 2018-03-14 | End: 2018-03-14 | Stop reason: SURG

## 2018-03-14 RX ORDER — DEXAMETHASONE SODIUM PHOSPHATE 4 MG/ML
INJECTION, SOLUTION INTRA-ARTICULAR; INTRALESIONAL; INTRAMUSCULAR; INTRAVENOUS; SOFT TISSUE AS NEEDED
Status: DISCONTINUED | OUTPATIENT
Start: 2018-03-14 | End: 2018-03-14 | Stop reason: SURG

## 2018-03-14 RX ORDER — ONDANSETRON 2 MG/ML
4 INJECTION INTRAMUSCULAR; INTRAVENOUS EVERY 6 HOURS PRN
Status: DISCONTINUED | OUTPATIENT
Start: 2018-03-14 | End: 2018-03-14 | Stop reason: HOSPADM

## 2018-03-14 RX ORDER — ONDANSETRON 2 MG/ML
4 INJECTION INTRAMUSCULAR; INTRAVENOUS EVERY 4 HOURS PRN
Status: DISCONTINUED | OUTPATIENT
Start: 2018-03-14 | End: 2018-03-14 | Stop reason: HOSPADM

## 2018-03-14 RX ORDER — HYDROCODONE BITARTRATE AND ACETAMINOPHEN 5; 325 MG/1; MG/1
1-2 TABLET ORAL EVERY 4 HOURS PRN
Qty: 30 TABLET | Refills: 0 | Status: SHIPPED | OUTPATIENT
Start: 2018-03-14 | End: 2018-06-25

## 2018-03-14 RX ORDER — SODIUM CHLORIDE 9 MG/ML
125 INJECTION, SOLUTION INTRAVENOUS CONTINUOUS
Status: DISCONTINUED | OUTPATIENT
Start: 2018-03-14 | End: 2018-03-14 | Stop reason: HOSPADM

## 2018-03-14 RX ADMIN — SODIUM CHLORIDE 125 ML/HR: 0.9 INJECTION, SOLUTION INTRAVENOUS at 14:48

## 2018-03-14 RX ADMIN — SCOPALAMINE 1 PATCH: 1 PATCH, EXTENDED RELEASE TRANSDERMAL at 10:35

## 2018-03-14 RX ADMIN — FENTANYL CITRATE 50 MCG: 50 INJECTION, SOLUTION INTRAMUSCULAR; INTRAVENOUS at 11:26

## 2018-03-14 RX ADMIN — PROPOFOL 200 MG: 10 INJECTION, EMULSION INTRAVENOUS at 11:24

## 2018-03-14 RX ADMIN — FENTANYL CITRATE 50 MCG: 50 INJECTION, SOLUTION INTRAMUSCULAR; INTRAVENOUS at 11:45

## 2018-03-14 RX ADMIN — NEOSTIGMINE METHYLSULFATE 3 MG: 1 INJECTION, SOLUTION INTRAMUSCULAR; INTRAVENOUS; SUBCUTANEOUS at 13:20

## 2018-03-14 RX ADMIN — ROCURONIUM BROMIDE 30 MG: 10 INJECTION INTRAVENOUS at 11:26

## 2018-03-14 RX ADMIN — LIDOCAINE HYDROCHLORIDE 100 MG: 20 INJECTION, SOLUTION INTRAVENOUS at 11:24

## 2018-03-14 RX ADMIN — FENTANYL CITRATE 50 MCG: 50 INJECTION, SOLUTION INTRAMUSCULAR; INTRAVENOUS at 11:21

## 2018-03-14 RX ADMIN — FENTANYL CITRATE 50 MCG: 50 INJECTION, SOLUTION INTRAMUSCULAR; INTRAVENOUS at 13:56

## 2018-03-14 RX ADMIN — HYDROMORPHONE HYDROCHLORIDE 0.5 MG: 1 INJECTION, SOLUTION INTRAMUSCULAR; INTRAVENOUS; SUBCUTANEOUS at 14:39

## 2018-03-14 RX ADMIN — MIDAZOLAM HYDROCHLORIDE 2 MG: 1 INJECTION, SOLUTION INTRAMUSCULAR; INTRAVENOUS at 11:21

## 2018-03-14 RX ADMIN — LORAZEPAM 0.5 MG: 2 INJECTION INTRAMUSCULAR; INTRAVENOUS at 14:45

## 2018-03-14 RX ADMIN — CEFAZOLIN SODIUM 2000 MG: 2 SOLUTION INTRAVENOUS at 11:33

## 2018-03-14 RX ADMIN — FENTANYL CITRATE 50 MCG: 50 INJECTION, SOLUTION INTRAMUSCULAR; INTRAVENOUS at 14:06

## 2018-03-14 RX ADMIN — HYDROMORPHONE HYDROCHLORIDE 0.5 MG: 1 INJECTION, SOLUTION INTRAMUSCULAR; INTRAVENOUS; SUBCUTANEOUS at 14:18

## 2018-03-14 RX ADMIN — DEXAMETHASONE SODIUM PHOSPHATE 4 MG: 4 INJECTION, SOLUTION INTRAMUSCULAR; INTRAVENOUS at 11:28

## 2018-03-14 RX ADMIN — FENTANYL CITRATE 50 MCG: 50 INJECTION, SOLUTION INTRAMUSCULAR; INTRAVENOUS at 12:05

## 2018-03-14 RX ADMIN — HYDROMORPHONE HYDROCHLORIDE 0.5 MG: 1 INJECTION, SOLUTION INTRAMUSCULAR; INTRAVENOUS; SUBCUTANEOUS at 14:29

## 2018-03-14 RX ADMIN — HYDROMORPHONE HYDROCHLORIDE 0.5 MG: 1 INJECTION, SOLUTION INTRAMUSCULAR; INTRAVENOUS; SUBCUTANEOUS at 14:08

## 2018-03-14 RX ADMIN — ONDANSETRON HYDROCHLORIDE 4 MG: 2 INJECTION, SOLUTION INTRAVENOUS at 11:26

## 2018-03-14 RX ADMIN — ONDANSETRON HYDROCHLORIDE 4 MG: 2 INJECTION, SOLUTION INTRAVENOUS at 12:19

## 2018-03-14 RX ADMIN — GLYCOPYRROLATE 0.6 MG: 0.2 INJECTION, SOLUTION INTRAMUSCULAR; INTRAVENOUS at 13:20

## 2018-03-14 RX ADMIN — HYDROCODONE BITARTRATE AND ACETAMINOPHEN 1 TABLET: 5; 325 TABLET ORAL at 17:09

## 2018-03-14 RX ADMIN — ONDANSETRON 4 MG: 2 INJECTION INTRAMUSCULAR; INTRAVENOUS at 13:44

## 2018-03-14 RX ADMIN — SODIUM CHLORIDE 125 ML/HR: 0.9 INJECTION, SOLUTION INTRAVENOUS at 10:30

## 2018-03-14 RX ADMIN — FENTANYL CITRATE 50 MCG: 50 INJECTION, SOLUTION INTRAMUSCULAR; INTRAVENOUS at 13:46

## 2018-03-14 RX ADMIN — SODIUM CHLORIDE: 0.9 INJECTION, SOLUTION INTRAVENOUS at 12:10

## 2018-03-14 RX ADMIN — TRIMETHOBENZAMIDE HYDROCHLORIDE 200 MG: 100 INJECTION INTRAMUSCULAR at 14:16

## 2018-03-14 NOTE — PROGRESS NOTES
D/C instructions reviewed w/ pt & sister in law  Verbalized understanding  3 abd incisions remain intact over abd R, L & mid, covered steristrip & gauze  RUQ inc w/ small amount old dry serosang drainage  Multiple trocar sites noted mid abdominal closed w/ histocryl & LIANE  Abd binder intact  Pt given extra gauze for home  Voided adequately in bathroom  C/O R>L abd pain, movement & deep breaths encouraged  Using small blanket for pillow  Gait improved on walk back from bathroom & stated more comfortable   Medicated w/ Norco

## 2018-03-14 NOTE — ANESTHESIA PREPROCEDURE EVALUATION
Review of Systems/Medical History  Patient summary reviewed  Chart reviewed  History of anesthetic complications PONV    Cardiovascular  Hypertension controlled,    Pulmonary  Asthma: well controlled/ stable Last rescue: today Asthma type of rescue: inhaled steroids, Shortness of breath, Sleep apnea ,   Comment: Admits to still not sleeping well at night and is not using CPAP  I encouraged her to discuss this with her pcp  GI/Hepatic    GERD poorly controlled,  Hiatal hernia,   Comment: "always nauseous"     Negative  ROS        Endo/Other  Diabetes well controlled type 2 Oral agent,   Obesity    GYN  Negative gynecology ROS          Hematology  Anemia ,     Musculoskeletal  Negative musculoskeletal ROS        Neurology    No neuromuscular disease , Headaches,   Comment: Last migraine more than a month ago Psychology   Depression ,              Physical Exam    Airway    Mallampati score: I  TM Distance: >3 FB  Neck ROM: full     Dental   No notable dental hx     Cardiovascular  Rhythm: regular, Rate: normal, Cardiovascular exam normal    Pulmonary  Pulmonary exam normal Breath sounds clear to auscultation,     Other Findings        Anesthesia Plan  ASA Score- 2     Anesthesia Type-   Additional Monitors:   Airway Plan: ETT  Comment: PONV--SCOP patch ordered  Plan Factors-Patient not instructed to abstain from smoking on day of procedure  Patient did not smoke on day of surgery  Induction- intravenous  Postoperative Plan- Plan for postoperative opioid use  Informed Consent- Anesthetic plan and risks discussed with patient

## 2018-03-14 NOTE — DISCHARGE SUMMARY
Discharge Summary - Josiah Pop 50 y o  female MRN: [id number]    Unit/Bed#: OR POOL Encounter: [id number]      Pre-Operative Diagnosis: Pre-Op Diagnosis Codes:     * Incisional hernia, without obstruction or gangrene [K43 2]    Post-Operative Diagnosis: Post-Op Diagnosis Codes:     * Incisional hernia, without obstruction or gangrene [K43 2]    Procedures Performed:  Procedure(s):  REPAIR HERNIA INCISIONAL LAPAROSCOPIC WITH MESH INSERTION    Surgeon: Valeria Hayes MD    See H & P for full details of admission and Operative Note for full details of operations performed  Patient was seen and examined prior to discharge  Provisions for Follow-Up Care:  See After Visit Summary for information related to follow-up care and home orders  Disposition: Home, in stable condition  Planned Readmission: No    Discharge Medications:  See after visit summary for reconciled discharge medications provided to patient and family  Post Operative instructions: Reviewed with patient and/or family  Some portions of this record may have been generated with voice recognition software  There may be translation, syntax,  or grammatical errors  Occasional wrong word or "sound-a-like" substitutions may have occurred due to the inherent limitations of the voice recognition software  Read the chart carefully and recognize, using context, where substitutions may have occurred  If you have any questions, please contact the dictating provider for clarification or correction, as needed  This encounter has been coded by non certified Coder      Signature:   Valeria Hayes MD  Date: 3/14/2018 Time: 1:24 PM

## 2018-03-14 NOTE — ANESTHESIA POSTPROCEDURE EVALUATION
Post-Op Assessment Note      CV Status:  Stable    Mental Status:  Alert and awake    Hydration Status:  Euvolemic    PONV Controlled:  Controlled    Airway Patency:  Patent  Airway: intubated    Post Op Vitals Reviewed: Yes          Staff: Anesthesiologist           BP (!) 171/92 (03/14/18 1416)    Temp      Pulse 68 (03/14/18 1416)   Resp 20 (03/14/18 1416)    SpO2 100 % (03/14/18 1416)

## 2018-03-14 NOTE — H&P (VIEW-ONLY)
Assessment/Plan:   Danisha Chand is a 50 y  o female who is here for The encounter diagnosis was Ventral hernia without obstruction or gangrene  Incisional Hernia    82-year-old female status post total hysterectomy approximately year ago with bulge above incision and pain associated with this  Denies change in bowel habits but alternates between diarrhea and constipation    On physical exam first there is a ventral hernia above the umbilicus and previous scar  Plan: laparoscopic repair of Incisional Ventral Hernia  Will obtain a CT scan of abdomen pelvis  Discussed need for screening colonoscopy        Preoperative Clearance: none          Imaging: No new pertinent imaging studies  Patient was never admitted  _____________________________________________      HPI:  Danisha Chand is a 50 y  o female who was referred for evaluation of Hernia (ventral)    Currently complaining of initial Incisional Ventral Hernia  worse with bending, lifting, standing, walking,   no nausea and no vomiting,  Bowel habits fluctuate between constipation and diarrhea  Constant and over 6 months      Prior abdominal surgery: total hysterectomy        ROS:  General ROS: negative  negative for - chills, fatigue, fever or night sweats, weight loss  Respiratory ROS: no cough, shortness of breath, or wheezing  Cardiovascular ROS: no chest pain or dyspnea on exertion  Genito-Urinary ROS: no dysuria, trouble voiding, or hematuria  Musculoskeletal ROS: negative for - gait disturbance, joint pain or muscle pain  Neurological ROS: no TIA or stroke symptoms  Abdominal ROS: see HPI  GI ROS: see HPI  Skin ROS: no new rashes or lesions   Lymphatic ROS: no new adenopathy noted by pt     GYN ROS: see HPI, no new GYN history or bleeding noted  Psy ROS: no new mental or behavioral disturbances       Patient Active Problem List   Diagnosis    Hypertension, essential    Hypokalemia    Impaired fasting blood sugar    Mild intermittent asthma without complication    Depression    Gastroesophageal reflux disease without esophagitis    Ventral hernia without obstruction or gangrene    Anemia    H/O: hysterectomy    Hot flashes         Allergies: Zolpidem; Escitalopram; Latex; and Seasonal ic [cholestatin]    Meds:  Current Outpatient Prescriptions:     albuterol (PROVENTIL HFA,VENTOLIN HFA) 90 mcg/act inhaler, Inhale 2 puffs every 6 (six) hours as needed, Disp: , Rfl:     budesonide-formoterol (SYMBICORT) 80-4 5 MCG/ACT inhaler, Inhale 2 puffs, Disp: , Rfl:     hydrochlorothiazide (HYDRODIURIL) 25 mg tablet, Take 1 tablet (25 mg total) by mouth daily, Disp: 30 tablet, Rfl: 5    metFORMIN (GLUCOPHAGE) 500 mg tablet, Take 500 mg by mouth 2 (two) times a day with meals, Disp: , Rfl:     pantoprazole (PROTONIX) 20 mg tablet, Take 1 tablet (20 mg total) by mouth daily, Disp: 30 tablet, Rfl: 5    zinc gluconate 50 mg tablet, Take 50 mg by mouth daily, Disp: , Rfl:     acetaminophen (TYLENOL) 500 mg tablet, Take 500 mg by mouth every 8 (eight) hours, Disp: , Rfl:     Ascorbic Acid, Vitamin C, (VITAMIN C) 100 MG tablet, Take 250 mg by mouth, Disp: , Rfl:     buPROPion (WELLBUTRIN XL) 150 mg 24 hr tablet, Take 1 tablet (150 mg total) by mouth every morning, Disp: 30 tablet, Rfl: 1    docusate sodium (COLACE) 100 mg capsule, Take 100 mg by mouth, Disp: , Rfl:     ferrous sulfate 325 (65 Fe) mg tablet, Take 325 mg by mouth, Disp: , Rfl:     LORazepam (ATIVAN) 0 5 mg tablet, Take 0 5 mg by mouth every 6 (six) hours, Disp: , Rfl:     PSEUDOEPHEDRINE SULFATE PO, Take by mouth, Disp: , Rfl:     traMADol (ULTRAM) 50 mg tablet, Take 50 mg by mouth every 4 (four) hours, Disp: , Rfl:     PMH:  Past Medical History:   Diagnosis Date    [place]     Asthma     Depression     Diabetes ([facility] [place] [address] )     GERD (gastroesophageal reflux disease)     Hx of hysterectomy     Hypertension        PSH:  Past Surgical History:   Procedure Laterality Date    HYSTERECTOMY      SINUS SURGERY      sinus and septum deviation correction and adenoids were removed    WISDOM TOOTH EXTRACTION         Family History   Problem Relation Age of Onset    Hypertension Mother     Stroke Mother     Depression Mother     Anxiety disorder Mother     Glaucoma Mother     Cancer Father      kidney and stomach    Hypertension Father     Depression Father     Anxiety disorder Father     Glaucoma Father     Diabetes Sister     Anxiety disorder Sister     Heart attack Brother     Hypertension Brother         reports that she has never smoked  She has never used smokeless tobacco  She reports that she does not drink alcohol or use drugs  PHYSICAL EXAM  General Appearance:    Alert, cooperative, no distress, NAD   Head:    Normocephalic without obvious abnormality   Eyes:    PERRL, conjunctiva/corneas clear, EOM's intact        Neck:   Supple, no adenopathy, no JVD   Back:     Symmetric, no spinal or CVA tenderness   Lungs:     Clear to auscultation bilaterally, no wheezing or rhonchi   Heart:    Regular rate and rhythm, S1 and S2 normal, no murmur   Abdomen:      moderate tenderness in the supraumbilical  Bowel sounds are normal     Ventral Incisional Hernia  The hernia is, easily reducible,, The fascial edges are easily palpable   Extremities:   Extremities normal  No clubbing, cyanosis or edema   Psych:   Normal Affect, AOx3  Neurologic:  Skin:   CNII-XII intact  Strength symmetric, speech intact    Warm, dry, intact, no visible rashes or lesions                   Informed consent for procedure was personally discussed, reviewed, and signed by Dr Sil Michele  Discussion by Dr Sil Michele was carried out regarding risks, benefits, and alternatives with the patient  Risks include but are not limited to:  bleeding, infection, and delayed wound healing or an open wound, pulmonary embolus, leaks from bowel or bile ducts or other viscus, transfusions, death    Discussed in further detail the more common complications and their rates of occurrence   was used if necessary  Patient expressed understanding of the issues discussed and wished/consented to proceed  All questions were answered by Dr Sil Michele  Discussion performed between patient and the provider signing below  Signature:   Meron Chadwick MD    Date: 2/16/2018 Time: 8:35 AM       Some portions of this record may have been generated with voice recognition software  There may be translation, syntax,  or grammatical errors  Occasional wrong word or "sound-a-like" substitutions may have occurred due to the inherent limitations of the voice recognition software  Read the chart carefully and recognize, using context, where substitutions may have occurred  If you have any questions, please contact the dictating provider for clarification or correction, as needed  This encounter has been coded by a non-certified coder

## 2018-03-14 NOTE — OP NOTE
REPAIR HERNIA INCISIONAL LAPAROSCOPIC WITH MESH INSERTION  Postoperative Note  PATIENT NAME: Tory Bae  : 1965  MRN: [id number]  [facility] OR ROOM 04    Surgery Date: 3/14/2018    Pre operative diagnosis:  Incisional hernia, without obstruction or gangrene [K43 2]    Operative Indications:  Symptomatic Ventral Hernia    Consent:  The risks, benefits, and alternatives to the surgery were discussed with the patient and with the family prior to surgery, personally by Dr Humble Youngblood  If the consent was obtained by the physician assistant or other representative, the consent was reviewed once again personally by the operating physician  Common complications particular for this procedure as well as unusual complications were discussed, including but not limited to:  bleeding, wound infection, prolonged wound healing, open wounds, reoperation, leak from the bowel or viscus, leak from the bile duct or injury to adjacent or other organs or blood vessels in the abdomen  If the surgery was laparoscopic, it was discussed that possible open surgery could also occur during that same surgery and is always an option in laparoscopic surgery, and/or reoperation  A  was used if necessary  The patient expressed understanding of the issues discussed and wished and consented to the procedure to proceed  All questions were answered  Dr Humble Youngblood personally discussed the informed consent with this patient  Operative Findings:  5-7 cm defect  Above the umbilicus, incisional    Post operative diagnosis:  Post-Op Diagnosis Codes:     * Incisional hernia, without obstruction or gangrene [K43 2]    Procedure:  Procedure(s):  REPAIR HERNIA INCISIONAL LAPAROSCOPIC WITH MESH INSERTION    Surgeon(s) and Role:     * Destinee Frances MD - Primary     * Kevin Harmon PA-C - Assisting        Physician Assistant was medically necessary for suturing, retraction, and hemostasis  No qualified resident was available   I was present for the entire procedure  Drains:       Specimens:  ID Type Source Tests Collected by Time Destination   1 : INCISIONAL HERNIA SAC Tissue Hernia Sac, Umbilical TISSUE EXAM Valeria Hayes MD 3/14/2018 1215        Estimated Blood Loss:   50 mL    Anesthesia Type:   Choice     Findings:   Josiah Pop was brought to the operating room and identified  Location of the procedure site was confirmed with the patient  General anesthesia was initiated and a NGT and Damian were placed  The patient was prepped and draped in a sterile fashion  A plastic  Vi-drape was used over the skin with draping  A time out was performed  A RUQ incision was made and the abdomen was entered under direct vision, using a modified open technique  A pneumoperitoneum was created  There was no evidence of intraabdominal injury or bleeding  Additional trocars were placed in the RLQ and LUQ under  direct vision  Extensive dissection was carried out using cautery, scissors, and Harmonic scalpel, to excise the hernia sac and reduce the hernia  Great care was taken to inspect the bowel and ensure there were no  defects or injury  If possible, the fascia was closed, using a suture-passer and 0 PDS sutures  An appropriate mesh was selected, allowing for 3-5 cm of overlap  This was secured onto the anterior abdominal wall (from the inside) using alternating Gor-Carrington and Prolene sutures to allow for smooth positioning, and making sure the rough side was  facing the fascia layer and the GorTex side facing down towards the abdominal cavity  The gaps between sutures were closed and secured using a long-term absorbable stapling device  Great care was used to ensure there were no gaps in the mesh and a double  crown stapling technique was used  Multiple staplers were used to provide adequate closure  The 10mm port site was closed using a suture passer and a prolene suture  The pneumoperitoneum was released       All port sites were closed using 4-0 Monocryl and the stab incisions were closed using Histocryl  The umbilicus was tucked in using a 4x4 under the binder  An abdominal binder was placed and closed firmly  A [employer] Com[employer]ix L/P mesh, size: 11 [place] , was used to secure the defect  I was present for the entire procedure  Some portions of this record may have been generated with voice recognition software  There may be translation, syntax,  or grammatical errors  Occasional wrong word or "sound-a-like" substitutions may have occurred due to the inherent limitations of the voice recognition software  Read the chart carefully and recognize, using context, where substations may have occurred  If you have any questions, please contact the dictating provider for clarification or correction, as needed       Patient Disposition:  PACU     Condition: Stable    SIGNATURE: Charmayne Honey, MD   DATE: March 14, 2018   TIME: 1:23 PM

## 2018-03-14 NOTE — DISCHARGE INSTRUCTIONS
[facility] Instructions  Dr Cortez Amanda MD, FACS    1  General: You will feel pulling sensations around the wound or funny aches and pains around the incisions  This is normal  Even minor surgery is a change in your body and this is your bodys way of reaction to it  If you have had abdominal surgery, it may help to support the incision with a small pillow or blanket for comfort when moving or coughing  2  Wound care: Make sure to remove the bandage in about 24 hours, unless instructed otherwise  You usually don't have to redress the wound after 24-48 hours, unless for comfort  Keep the incision clean and dry  Let air get to it  If this Steri-Strips fall off, just keep the wound clean  3  Water: You may shower over the wound, unless there are drain tubes left in place  Do not bathe or use a pool or hot tub until cleared by the physician  You may shower right over the staples or Steri-Strips and packing dry when you are done  4  Activity: You may go up and down stairs, walk as much as you are comfortable, but walk at least 3 times each day  If you have had abdominal surgery, do not lift anything heavier than 15 pounds for at least 2-4 weeks, unless cleared by the doctor  5  Diet: You may resume a regular diet  If you had a same-day surgery or overnight stay surgery, you may wish to eat lightly for a few days: soups, crackers, and sandwiches  You may resume a regular diet when ready  6  Medications: Resume all of your previous medications, unless told otherwise by the doctor  Avoid aspirin or ibuprofen (Advil, Motrin, etc ) products for 2-3 days after the date of surgery  You may, at that time, began to take them again  Tylenol is always fine, unless you are taking any narcotic pain medication containing Tylenol (such as Percocet, Darvocet, Vicodin, or anything containing acetaminophen)  Do not take Tylenol if you're taking these medications   You do not need to take the narcotic pain medications unless you are having significant pain and discomfort  7  Driving: You will need someone to drive you home on the day of surgery  Do not drive or make any important decisions while on narcotic pain medication or 24 hours and after anesthesia or sedation for surgery  Generally, you may drive when your off all narcotic pain medications  8  Upset Stomach: You may take Maalox, Tums, or similar items for an upset stomach  If your narcotic pain medication causes an upset stomach, do not take it on an empty stomach  Try taking it with at least some crackers or toast      9  Constipation: Patients often experienced constipation after surgery  You may take over-the-counter medication for this, such as Metamucil, Senokot, Dulcolax, milk of magnesia, etc  You may take a suppository unless you have had anorectal surgery such as a procedure on your hemorrhoids  If you experience significant nausea or vomiting after abdominal surgery, call the office before trying any of these medications  10  Call the office: If you are experiencing any of the following, fevers above 101 5°, significant nausea or vomiting, if the wound develops drainage and/or is excessive redness around the wound, or if you have significant diarrhea or other worsening symptoms  11  Pain: You may be given a prescription for pain  This will be given to the hospital, the day of surgery  12  Sexual Activity: You may resume sexual activity when you feel ready and comfortable and your incision is sealed and healed without apparent infection risk  13  Urination: If you haven't urinated in 6 hours, go directly to the ER for evaluation for urinary retention       [address], [address]  [place], [address]                                                                                                                     Phone: [phone]

## 2018-03-15 ENCOUNTER — TELEPHONE (OUTPATIENT)
Dept: SURGERY | Facility: CLINIC | Age: 53
End: 2018-03-15

## 2018-03-15 NOTE — TELEPHONE ENCOUNTER
Called pt post cholecystectomy 3/14  States she is having pain and using pain meds  Feels more when getting up and down  Advised to try small pillow to aid in pressure holding this against her incisions  Taking in fluids well but not eating much  Will try bland diet and also advised to have something in her stomach prior to taking pain meds  No vomiting but has had some nausea and is using anti-emetics which help  Has not moved bowels yet is using stool softners  Drsgs are dry and intact no drainage noted and is wearing binder  Discussed keeping the incisions dry before replacing fresh bandages  Verbalized understanding  Advised to contact office w/ concerns or questions prior to post op ov  Pathology is pending and pt is aware  Results show no malignancy, tissue consistent w/ hernia sac  Left message for pt to return call  3/19    Pt returned call, discussed pathology results and pt verbalized understanding  She states she has not moved bowels yet post op and continues to be in pain  Meds do help  Pt states she is putting off trying to move her bowels at this time due to the pain  Advised pt it is not healthy to avoid this and advised to try mom w/ prune juice  Pt verbalized understanding and stated she would work on moving her bowels  Providers made aware of current issues and pt has post op next week

## 2018-03-26 ENCOUNTER — OFFICE VISIT (OUTPATIENT)
Dept: SURGERY | Facility: CLINIC | Age: 53
End: 2018-03-26

## 2018-03-26 VITALS
DIASTOLIC BLOOD PRESSURE: 60 MMHG | WEIGHT: 175 LBS | TEMPERATURE: 97 F | HEART RATE: 88 BPM | BODY MASS INDEX: 32.2 KG/M2 | HEIGHT: 62 IN | RESPIRATION RATE: 16 BRPM | SYSTOLIC BLOOD PRESSURE: 110 MMHG

## 2018-03-26 DIAGNOSIS — K43.2 INCISIONAL HERNIA, WITHOUT OBSTRUCTION OR GANGRENE: Primary | ICD-10-CM

## 2018-03-26 PROCEDURE — 99024 POSTOP FOLLOW-UP VISIT: CPT | Performed by: SURGERY

## 2018-03-26 NOTE — PROGRESS NOTES
Assessment/Plan:   Ab Maloney is a 50 y  o female who comes in today for postoperative check after laparoscopic ventral incisional hernia repair on 03/14/2018  Patient with some soreness and pain but otherwise doing well    Pathology: Reviewed with patient, all questions answered  Hernia sac without any malignancy    Postoperative restrictions reviewed, including specific lifting and exercise restrictions  All questions answered  Follow up in 2 weeks for evaluation of release back to work     ______________________________________________________  HPI:  Ab Maloney is a 50 y  o female who comes in today for postoperative check after recent  on   Currently doing well with some problems : soreness, no fever or chills,no nausea and no vomiting  Reports some soreness and occasional sharp pain with activity  Otherwise doing well  Bowels functional     ROS:  General ROS: negative for - chills, fatigue, fever or night sweats, weight loss  Respiratory ROS: no cough, shortness of breath, or wheezing  Cardiovascular ROS: no chest pain or dyspnea on exertion  Genito-Urinary ROS: no dysuria, trouble voiding, or hematuria  Musculoskeletal ROS: negative for - gait disturbance, joint pain or muscle pain  Neurological ROS: no TIA or stroke symptoms  GI ROS: see HPI  Skin ROS: no new rashes or lesions   Lymphatic ROS: no new adenopathy noted by pt     GYN ROS: see HPI, no new GYN history or bleeding noted  Psy ROS: no new mental or behavioral disturbances       Patient Active Problem List   Diagnosis    Hypertension, essential    Hypokalemia    Impaired fasting blood sugar    Mild intermittent asthma without complication    Depression    Gastroesophageal reflux disease without esophagitis    Ventral hernia without obstruction or gangrene    Anemia    H/O: hysterectomy    Hot flashes    Incisional hernia, without obstruction or gangrene         Escitalopram; Zolpidem; and Latex      Current Outpatient Prescriptions:     acetaminophen (TYLENOL) 500 mg tablet, Take 500-1,000 mg by mouth every 6 (six) hours as needed  , Disp: , Rfl:     albuterol (PROVENTIL HFA,VENTOLIN HFA) 90 mcg/act inhaler, Inhale 2 puffs every 6 (six) hours as needed, Disp: , Rfl:     budesonide-formoterol (SYMBICORT) 80-4 5 MCG/ACT inhaler, Inhale 2 puffs daily as needed  , Disp: , Rfl:     docusate sodium (COLACE) 100 mg capsule, Take 1 capsule (100 mg total) by mouth 2 (two) times a day for 30 days, Disp: 60 capsule, Rfl: 0    hydrochlorothiazide (HYDRODIURIL) 25 mg tablet, Take 1 tablet (25 mg total) by mouth daily, Disp: 30 tablet, Rfl: 5    HYDROcodone-acetaminophen (NORCO) 5-325 mg per tablet, Take 1-2 tablets by mouth every 4 (four) hours as needed for pain for up to 30 doses Max Daily Amount: 12 tablets, Disp: 30 tablet, Rfl: 0    metFORMIN (GLUCOPHAGE) 500 mg tablet, Take 500 mg by mouth 2 (two) times a day with meals, Disp: , Rfl:     pantoprazole (PROTONIX) 20 mg tablet, Take 1 tablet (20 mg total) by mouth daily, Disp: 30 tablet, Rfl: 5    VITAMIN E PO, Take 1 tablet by mouth daily, Disp: , Rfl:     ZINC GLUCONATE PO, Take 40 mg by mouth daily, Disp: , Rfl:     ondansetron (ZOFRAN) 4 mg tablet, Take 1 tablet (4 mg total) by mouth every 8 (eight) hours as needed for nausea or vomiting for up to 7 days, Disp: 20 tablet, Rfl: 0    Past Medical History:   Diagnosis Date    Acute bronchitis     resolved - finished antibiotics 3/12    [place]     Asthma     Depression     Dizziness     occ    GERD (gastroesophageal reflux disease)     Headache disorder     Hiatal hernia     History of gastric ulcer     Hx of hysterectomy     Hypertension     Incisional hernia without obstruction or gangrene     Migraines     PONV (postoperative nausea and vomiting)     Prediabetes     Shortness of breath     mod exertion    Sleep apnea     doesnt use cpap    Wears glasses        Past Surgical History:   Procedure Laterality Date    HYSTERECTOMY      MASS EXCISION      genitalia area- benign    MD LAP, INCISIONAL HERNIA REPAIR,REDUCIBLE N/A 3/14/2018    Procedure: REPAIR HERNIA INCISIONAL LAPAROSCOPIC WITH MESH INSERTION;  Surgeon: Lilo Randolph MD;  Location: [facility];  Service: General    SINUS SURGERY      sinus and septum deviation correction and adenoids were removed    WISDOM TOOTH EXTRACTION         Family History   Problem Relation Age of Onset   Janice Parra Hypertension Mother     Stroke Mother     Depression Mother     Anxiety disorder Mother     Glaucoma Mother     Cancer Father      kidney and stomach    Hypertension Father     Depression Father     Anxiety disorder Father     Glaucoma Father     Diabetes Sister     Anxiety disorder Sister     Heart attack Brother     Hypertension Brother         reports that she quit smoking about 16 years ago  Her smoking use included Cigarettes  She has never used smokeless tobacco  She reports that she drinks alcohol  She reports that she does not use drugs  PHYSICAL EXAM  General: normal, cooperative, no distress  Abdominal: soft, nondistended or nontender  Incision: clean, dry, and intact and healing well      Some portions of this record may have been generated with voice recognition software  There may be translation, syntax,  or grammatical errors  Occasional wrong word or "sound-a-like" substitutions may have occurred due to the inherent limitations of the voice recognition software  Read the chart carefully and recognize, using context, where substitutions may have occurred  If you have any questions, please contact the dictating provider for clarification or correction, as needed  This encounter has been coded by a non-certified coder         Lilo Randolph MD    Date: 3/26/2018 Time: 2:15 PM

## 2018-04-09 ENCOUNTER — OFFICE VISIT (OUTPATIENT)
Dept: SURGERY | Facility: CLINIC | Age: 53
End: 2018-04-09

## 2018-04-09 VITALS
HEIGHT: 62 IN | WEIGHT: 176 LBS | DIASTOLIC BLOOD PRESSURE: 60 MMHG | HEART RATE: 72 BPM | SYSTOLIC BLOOD PRESSURE: 100 MMHG | RESPIRATION RATE: 16 BRPM | TEMPERATURE: 96.9 F | BODY MASS INDEX: 32.39 KG/M2

## 2018-04-09 DIAGNOSIS — Z48.89 ENCOUNTER FOR SURGICAL FOLLOW-UP CARE: Primary | ICD-10-CM

## 2018-04-09 DIAGNOSIS — K76.0 HEPATIC STEATOSIS: ICD-10-CM

## 2018-04-09 DIAGNOSIS — K76.89 LIVER NODULE: ICD-10-CM

## 2018-04-09 PROCEDURE — 99024 POSTOP FOLLOW-UP VISIT: CPT | Performed by: SURGERY

## 2018-04-09 RX ORDER — BUDESONIDE AND FORMOTEROL FUMARATE DIHYDRATE 80; 4.5 UG/1; UG/1
2 AEROSOL RESPIRATORY (INHALATION)
COMMUNITY
Start: 2016-08-04 | End: 2018-04-09 | Stop reason: SDUPTHER

## 2018-04-09 NOTE — LETTER
April 9, 2018     Patient: Debbie Graves   YOB: 1965   Date of Visit: 4/9/2018       To Whom it May Concern:    Debbie Graves is under my professional care  She was seen in my office on 4/9/2018  She may return to work without any limitations on 4/16/2018  If you have any questions or concerns, please don't hesitate to call           Sincerely,          Barry Wyatt MD

## 2018-04-09 NOTE — PROGRESS NOTES
Assessment/Plan:   Alicia Waite is a 50 y  o female who comes in today for postoperative check after  Ventral incisional hernia   Doing well, less pain and discomfort,  Ct scan with liver nodule suggested MRI    Pathology: None sent    Postoperative restrictions reviewed, including specific lifting and exercise restrictions  All questions answered  Schedule MRI of liver   ______________________________________________________  HPI:  Alicia Waite is a 50 y  o female who comes in today for postoperative check after recent  on   Currently doing well without problems, no fever or chills,no nausea and no vomiting  Reports some soreness but able to move better without assistance  ROS:  General ROS: negative for - chills, fatigue, fever or night sweats, weight loss  Respiratory ROS: no cough, shortness of breath, or wheezing  Cardiovascular ROS: no chest pain or dyspnea on exertion  Genito-Urinary ROS: no dysuria, trouble voiding, or hematuria  Musculoskeletal ROS: negative for - gait disturbance, joint pain or muscle pain  Neurological ROS: no TIA or stroke symptoms  GI ROS: see HPI  Skin ROS: no new rashes or lesions   Lymphatic ROS: no new adenopathy noted by pt     GYN ROS: see HPI, no new GYN history or bleeding noted  Psy ROS: no new mental or behavioral disturbances       Patient Active Problem List   Diagnosis    Hypertension, essential    Hypokalemia    Impaired fasting blood sugar    Mild intermittent asthma without complication    Depression    Gastroesophageal reflux disease without esophagitis    Ventral hernia without obstruction or gangrene    Anemia    H/O: hysterectomy    Hot flashes    Incisional hernia, without obstruction or gangrene         Escitalopram; Zolpidem; and Latex      Current Outpatient Prescriptions:     acetaminophen (TYLENOL) 500 mg tablet, Take 500-1,000 mg by mouth every 6 (six) hours as needed  , Disp: , Rfl:     albuterol (PROVENTIL HFA,VENTOLIN HFA) 90 mcg/act inhaler, Inhale 2 puffs every 6 (six) hours as needed, Disp: , Rfl:     budesonide-formoterol (SYMBICORT) 80-4 5 MCG/ACT inhaler, Inhale 2 puffs daily as needed  , Disp: , Rfl:     docusate sodium (COLACE) 100 mg capsule, Take 1 capsule (100 mg total) by mouth 2 (two) times a day for 30 days, Disp: 60 capsule, Rfl: 0    hydrochlorothiazide (HYDRODIURIL) 25 mg tablet, Take 1 tablet (25 mg total) by mouth daily, Disp: 30 tablet, Rfl: 5    HYDROcodone-acetaminophen (NORCO) 5-325 mg per tablet, Take 1-2 tablets by mouth every 4 (four) hours as needed for pain for up to 30 doses Max Daily Amount: 12 tablets, Disp: 30 tablet, Rfl: 0    metFORMIN (GLUCOPHAGE) 500 mg tablet, Take 500 mg by mouth 2 (two) times a day with meals, Disp: , Rfl:     pantoprazole (PROTONIX) 20 mg tablet, Take 1 tablet (20 mg total) by mouth daily, Disp: 30 tablet, Rfl: 5    VITAMIN E PO, Take 1 tablet by mouth daily, Disp: , Rfl:     ZINC GLUCONATE PO, Take 40 mg by mouth daily, Disp: , Rfl:     ondansetron (ZOFRAN) 4 mg tablet, Take 1 tablet (4 mg total) by mouth every 8 (eight) hours as needed for nausea or vomiting for up to 7 days, Disp: 20 tablet, Rfl: 0    Past Medical History:   Diagnosis Date    Acute bronchitis     resolved - finished antibiotics 3/12    [place]     Asthma     Depression     Dizziness     occ    GERD (gastroesophageal reflux disease)     Headache disorder     Hiatal hernia     History of gastric ulcer     Hx of hysterectomy     Hypertension     Incisional hernia without obstruction or gangrene     Migraines     PONV (postoperative nausea and vomiting)     Prediabetes     Shortness of breath     mod exertion    Sleep apnea     doesnt use cpap    Wears glasses        Past Surgical History:   Procedure Laterality Date    HYSTERECTOMY      MASS EXCISION      genitalia area- benign    GA LAP, INCISIONAL HERNIA REPAIR,REDUCIBLE N/A 3/14/2018    Procedure: REPAIR HERNIA INCISIONAL LAPAROSCOPIC WITH MESH INSERTION;  Surgeon: Karen bOando MD;  Location: [facility];  Service: General    SINUS SURGERY      sinus and septum deviation correction and adenoids were removed    WISDOM TOOTH EXTRACTION         Family History   Problem Relation Age of Onset   [facility] Hypertension Mother     Stroke Mother     Depression Mother     Anxiety disorder Mother     Glaucoma Mother     Cancer Father      kidney and stomach    Hypertension Father     Depression Father     Anxiety disorder Father     Glaucoma Father     Diabetes Sister     Anxiety disorder Sister     Heart attack Brother     Hypertension Brother         reports that she quit smoking about 16 years ago  Her smoking use included Cigarettes  She has never used smokeless tobacco  She reports that she drinks alcohol  She reports that she does not use drugs  PHYSICAL EXAM  General: normal, cooperative, no distress  Abdominal: soft, nondistended or nontender  Incision: clean, dry, and intact and healing well      Some portions of this record may have been generated with voice recognition software  There may be translation, syntax,  or grammatical errors  Occasional wrong word or "sound-a-like" substitutions may have occurred due to the inherent limitations of the voice recognition software  Read the chart carefully and recognize, using context, where substitutions may have occurred  If you have any questions, please contact the dictating provider for clarification or correction, as needed  This encounter has been coded by a non-certified coder         Karen Obando MD    Date: 4/9/2018 Time: 2:19 PM

## 2018-04-09 NOTE — LETTER
April 9, 2018     Ericka Granados MD  [id number] [facility] [address]    Patient: Pramod Najera   YOB: 1965   Date of Visit: 4/9/2018       Dear Dr Christiana Us: Thank you for referring Pramod Najera to me for evaluation  Below are my notes for this consultation  If you have questions, please do not hesitate to call me  I look forward to following your patient along with you  Sincerely,        Marie Dorsey MD        CC: No Recipients  Kimberly   4/9/2018  2:23 PM  Sign at close encounter  Assessment/Plan:   Pramod Najera is a 50 y  o female who comes in today for postoperative check after  Ventral incisional hernia   Doing well, less pain and discomfort,  Ct scan with liver nodule suggested MRI    Pathology: None sent    Postoperative restrictions reviewed, including specific lifting and exercise restrictions  All questions answered  Schedule MRI of liver   ______________________________________________________  HPI:  Pramod Najera is a 50 y  o female who comes in today for postoperative check after recent  on   Currently doing well without problems, no fever or chills,no nausea and no vomiting  Reports some soreness but able to move better without assistance  ROS:  General ROS: negative for - chills, fatigue, fever or night sweats, weight loss  Respiratory ROS: no cough, shortness of breath, or wheezing  Cardiovascular ROS: no chest pain or dyspnea on exertion  Genito-Urinary ROS: no dysuria, trouble voiding, or hematuria  Musculoskeletal ROS: negative for - gait disturbance, joint pain or muscle pain  Neurological ROS: no TIA or stroke symptoms  GI ROS: see HPI  Skin ROS: no new rashes or lesions   Lymphatic ROS: no new adenopathy noted by pt     GYN ROS: see HPI, no new GYN history or bleeding noted  Psy ROS: no new mental or behavioral disturbances       Patient Active Problem List   Diagnosis    Hypertension, essential  Hypokalemia    Impaired fasting blood sugar    Mild intermittent asthma without complication    Depression    Gastroesophageal reflux disease without esophagitis    Ventral hernia without obstruction or gangrene    Anemia    H/O: hysterectomy    Hot flashes    Incisional hernia, without obstruction or gangrene         Escitalopram; Zolpidem; and Latex      Current Outpatient Prescriptions:     acetaminophen (TYLENOL) 500 mg tablet, Take 500-1,000 mg by mouth every 6 (six) hours as needed  , Disp: , Rfl:     albuterol (PROVENTIL HFA,VENTOLIN HFA) 90 mcg/act inhaler, Inhale 2 puffs every 6 (six) hours as needed, Disp: , Rfl:     budesonide-formoterol (SYMBICORT) 80-4 5 MCG/ACT inhaler, Inhale 2 puffs daily as needed  , Disp: , Rfl:     docusate sodium (COLACE) 100 mg capsule, Take 1 capsule (100 mg total) by mouth 2 (two) times a day for 30 days, Disp: 60 capsule, Rfl: 0    hydrochlorothiazide (HYDRODIURIL) 25 mg tablet, Take 1 tablet (25 mg total) by mouth daily, Disp: 30 tablet, Rfl: 5    HYDROcodone-acetaminophen (NORCO) 5-325 mg per tablet, Take 1-2 tablets by mouth every 4 (four) hours as needed for pain for up to 30 doses Max Daily Amount: 12 tablets, Disp: 30 tablet, Rfl: 0    metFORMIN (GLUCOPHAGE) 500 mg tablet, Take 500 mg by mouth 2 (two) times a day with meals, Disp: , Rfl:     pantoprazole (PROTONIX) 20 mg tablet, Take 1 tablet (20 mg total) by mouth daily, Disp: 30 tablet, Rfl: 5    VITAMIN E PO, Take 1 tablet by mouth daily, Disp: , Rfl:     ZINC GLUCONATE PO, Take 40 mg by mouth daily, Disp: , Rfl:     ondansetron (ZOFRAN) 4 mg tablet, Take 1 tablet (4 mg total) by mouth every 8 (eight) hours as needed for nausea or vomiting for up to 7 days, Disp: 20 tablet, Rfl: 0    Past Medical History:   Diagnosis Date    Acute bronchitis     resolved - finished antibiotics 3/12    [place]     Asthma     Depression     Dizziness     occ    GERD (gastroesophageal reflux disease)     Headache disorder     Hiatal hernia     History of gastric ulcer     Hx of hysterectomy     Hypertension     Incisional hernia without obstruction or gangrene     Migraines     PONV (postoperative nausea and vomiting)     Prediabetes     Shortness of breath     mod exertion    Sleep apnea     doesnt use cpap    Wears glasses        Past Surgical History:   Procedure Laterality Date    HYSTERECTOMY      MASS EXCISION      genitalia area- benign    MA LAP, INCISIONAL HERNIA REPAIR,REDUCIBLE N/A 3/14/2018    Procedure: REPAIR HERNIA INCISIONAL LAPAROSCOPIC WITH MESH INSERTION;  Surgeon: Lucio Hatch MD;  Location: [facility];  Service: General    SINUS SURGERY      sinus and septum deviation correction and adenoids were removed    WISDOM TOOTH EXTRACTION         Family History   Problem Relation Age of Onset    Hypertension Mother     Stroke Mother     Depression Mother     Anxiety disorder Mother     Glaucoma Mother     Cancer Father      kidney and stomach    Hypertension Father     Depression Father     Anxiety disorder Father     Glaucoma Father     Diabetes Sister     Anxiety disorder Sister     Heart attack Brother     Hypertension Brother         reports that she quit smoking about 16 years ago  Her smoking use included Cigarettes  She has never used smokeless tobacco  She reports that she drinks alcohol  She reports that she does not use drugs  PHYSICAL EXAM  General: normal, cooperative, no distress  Abdominal: soft, nondistended or nontender  Incision: clean, dry, and intact and healing well      Some portions of this record may have been generated with voice recognition software  There may be translation, syntax,  or grammatical errors  Occasional wrong word or "sound-a-like" substitutions may have occurred due to the inherent limitations of the voice recognition software  Read the chart carefully and recognize, using context, where substitutions may have occurred   If you have any questions, please contact the dictating provider for clarification or correction, as needed  This encounter has been coded by a non-certified coder         Marie Dorsey MD    Date: 4/9/2018 Time: 2:19 PM

## 2018-04-12 LAB — HBA1C MFR BLD HPLC: 6.1 %

## 2018-06-25 ENCOUNTER — OFFICE VISIT (OUTPATIENT)
Dept: FAMILY MEDICINE CLINIC | Facility: CLINIC | Age: 53
End: 2018-06-25

## 2018-06-25 VITALS
RESPIRATION RATE: 18 BRPM | WEIGHT: 180 LBS | TEMPERATURE: 98.4 F | HEIGHT: 62 IN | BODY MASS INDEX: 33.13 KG/M2 | SYSTOLIC BLOOD PRESSURE: 140 MMHG | OXYGEN SATURATION: 98 % | DIASTOLIC BLOOD PRESSURE: 62 MMHG | HEART RATE: 84 BPM

## 2018-06-25 DIAGNOSIS — R73.01 IMPAIRED FASTING BLOOD SUGAR: ICD-10-CM

## 2018-06-25 DIAGNOSIS — J40 BRONCHITIS: ICD-10-CM

## 2018-06-25 DIAGNOSIS — F32.A DEPRESSION, UNSPECIFIED DEPRESSION TYPE: ICD-10-CM

## 2018-06-25 DIAGNOSIS — I10 HYPERTENSION, ESSENTIAL: ICD-10-CM

## 2018-06-25 DIAGNOSIS — R55 SYNCOPE, UNSPECIFIED SYNCOPE TYPE: Primary | ICD-10-CM

## 2018-06-25 PROCEDURE — 99214 OFFICE O/P EST MOD 30 MIN: CPT | Performed by: PHYSICIAN ASSISTANT

## 2018-06-25 PROCEDURE — 93000 ELECTROCARDIOGRAM COMPLETE: CPT | Performed by: PHYSICIAN ASSISTANT

## 2018-06-25 RX ORDER — AZITHROMYCIN 250 MG/1
TABLET, FILM COATED ORAL
Qty: 6 TABLET | Refills: 0 | Status: SHIPPED | OUTPATIENT
Start: 2018-06-25 | End: 2018-06-29

## 2018-06-25 RX ORDER — BENZONATATE 100 MG/1
100-200 CAPSULE ORAL 3 TIMES DAILY PRN
Qty: 30 CAPSULE | Refills: 0 | Status: SHIPPED | OUTPATIENT
Start: 2018-06-25 | End: 2019-06-20

## 2018-06-25 NOTE — ASSESSMENT & PLAN NOTE
Currently metformin 500 mg twice daily  We will reassess her A1c given her recent events with labs as ordered today  She was encouraged to follow 6 months to continue to her glucose control

## 2018-06-25 NOTE — ASSESSMENT & PLAN NOTE
The patient states she never started taking the bupropion that she was previously prescribed in February  She is concerned about having a severe reaction to the medication like she did when she took Lexapro or Ambien  She was encouraged to see a psychologist for talk therapy  She was referred in February but states that she was never called  She does not want me to place another referral for this  We will continue to monitor

## 2018-06-25 NOTE — ASSESSMENT & PLAN NOTE
Patient reports that she frequently trouble with her blood pressure when she is in the office  Her blood pressure did go as high as 170/90 when I was checking it in the office  She previously shown trouble with white coat hypertension  She reports the blood pressure readings home are usually in the 110/60 range  She will continue her medications as they currently are  We did discuss though that if her blood pressure is consistently running on the low side around 100/60 as it was when she initially got here, she could try cutting back on hydrochlorothiazide a low blood pressure reading could have precipitated her to have fainting episodes

## 2018-06-25 NOTE — PROGRESS NOTES
[place] [facility] PRACTICE ACUTE OFFICE VISIT       NAME: Hema Musa  AGE: 48 y o  SEX: female       : 1965        MRN: [id number]    DATE: 2018  TIME: 6:50 PM    Assessment and Plan     Problem List Items Addressed This Visit     Hypertension, essential       Patient reports that she frequently trouble with her blood pressure when she is in the office  Her blood pressure did go as high as 170/90 when I was checking it in the office  She previously shown trouble with white coat hypertension  She reports the blood pressure readings home are usually in the 110/60 range  She will continue her medications as they currently are  We did discuss though that if her blood pressure is consistently running on the low side around 100/60 as it was when she initially got here, she could try cutting back on hydrochlorothiazide a low blood pressure reading could have precipitated her to have fainting episodes  Impaired fasting blood sugar     Currently metformin 500 mg twice daily  We will reassess her A1c given her recent events with labs as ordered today  She was encouraged to follow 6 months to continue to her glucose control  Relevant Medications    metFORMIN (GLUCOPHAGE) 500 mg tablet    Depression       The patient states she never started taking the bupropion that she was previously prescribed in February  She is concerned about having a severe reaction to the medication like she did when she took Lexapro or Ambien  She was encouraged to see a psychologist for talk therapy  She was referred in February but states that she was never called  She does not want me to place another referral for this  We will continue to monitor  Other Visit Diagnoses     Syncope, unspecified syncope type    -  Primary    Possibly vasovagal from hot flash and low BP  Check labs, ECHO  Reviewed EKG okay besides possible long QT  Refer to cardio   Consider neuro workup if recurs  Relevant Orders    POCT ECG (Completed)    CBC and differential    Basic metabolic panel    Hemoglobin A1c (w/out EAG)    TSH, 3rd generation with Free T4 reflex    Echo complete with contrast if indicated    Ambulatory referral to Cardiology    Bronchitis        Reviewed this is usually viral  Can try Tessalon Perles and continue with albuterol PRN  Given Z-Da but directed to wait until end of week to see if improving  Relevant Medications    benzonatate (TESSALON PERLES) 100 mg capsule    azithromycin (ZITHROMAX) 250 mg tablet          Impaired fasting blood sugar  Currently metformin 500 mg twice daily  We will reassess her A1c given her recent events with labs as ordered today  She was encouraged to follow 6 months to continue to her glucose control  Hypertension, essential    Patient reports that she frequently trouble with her blood pressure when she is in the office  Her blood pressure did go as high as 170/90 when I was checking it in the office  She previously shown trouble with white coat hypertension  She reports the blood pressure readings home are usually in the 110/60 range  She will continue her medications as they currently are  We did discuss though that if her blood pressure is consistently running on the low side around 100/60 as it was when she initially got here, she could try cutting back on hydrochlorothiazide a low blood pressure reading could have precipitated her to have fainting episodes  Depression    The patient states she never started taking the bupropion that she was previously prescribed in February  She is concerned about having a severe reaction to the medication like she did when she took Lexapro or Ambien  She was encouraged to see a psychologist for talk therapy  She was referred in February but states that she was never called  She does not want me to place another referral for this  We will continue to monitor          Chief Complaint Chief Complaint   Patient presents with    Loss of Consciousness     passed out twice on Fri       History of Present Illness   Siobhan Stokes is a 48y o -year-old female who presents for passed out  Notes that 3 days ago she was getting a hot flash when washing hands and getting ready for bed - notes that she lives alone so not sure of how long she was out but woke up on the floor - went into bedroom since she was going to bed anyways and notes that she thinks that she landed on the floor but not sure if she hit anything besides pushing her bed over some - notes that she thinks pupils were not reactive the next day but she had an episode of falling at the grocery store - has had episodes in the past where she would get hot then cold and dizzy and would pass if sat down - notes that usually nauseous with it in the past but not this time - remembers trying to turn off the water first - denies having not eaten for a prolonged time - denies any ETOH or drugs          Review of Systems   Review of Systems   Constitutional: Negative for chills and fever  Respiratory: Positive for chest tightness (yesterday) and shortness of breath (yesterday)  Cardiovascular: Negative for chest pain and palpitations  Gastrointestinal: Positive for nausea (chronic)  Negative for diarrhea and vomiting  Genitourinary: Negative for dysuria  Neurological: Positive for syncope and light-headedness  Psychiatric/Behavioral: Positive for decreased concentration (forgetful since hysterectomy about 1 5 years ago) and sleep disturbance (best is about 3-4 hours a night - no help from melatonin or Ambien)         Active Problem List     Patient Active Problem List   Diagnosis    Hypertension, essential    Hypokalemia    Impaired fasting blood sugar    Mild intermittent asthma without complication    Depression    Gastroesophageal reflux disease without esophagitis    Ventral hernia without obstruction or gangrene    Anemia    H/O: hysterectomy    Hot flashes    Incisional hernia, without obstruction or gangrene         Social History:  Social History     Social History    Marital status: Unknown     Spouse name: N/A    Number of children: N/A    Years of education: N/A     Occupational History    Not on file  Social History Main Topics    Smoking status: Former Smoker     Types: Cigarettes     Quit date: 2002    Smokeless tobacco: Never Used    Alcohol use Yes      Comment: few x year    Drug use: No    Sexual activity: Not on file     Other Topics Concern    Not on file     Social History Narrative    No narrative on file       Objective     Vitals:    06/25/18 1801   BP: 140/62   Pulse:    Resp:    Temp:    SpO2:      Wt Readings from Last 3 Encounters:   06/25/18 81 6 kg (180 lb)   04/09/18 79 8 kg (176 lb)   03/26/18 79 4 kg (175 lb)       Physical Exam   Constitutional: She appears well-developed and well-nourished  No distress  HENT:   Head: Normocephalic and atraumatic  Right Ear: External ear normal    Left Ear: External ear normal    Nose: Nose normal    Mouth/Throat: Oropharynx is clear and moist    Neck: Neck supple  No thyromegaly present  Cardiovascular: Normal rate, regular rhythm, normal heart sounds and intact distal pulses  No murmur heard  Pulmonary/Chest: Effort normal and breath sounds normal  She has no wheezes  She has no rales  Musculoskeletal: She exhibits no edema  Lymphadenopathy:     She has no cervical adenopathy  Neurological: She has normal strength  No sensory deficit  She displays a negative Romberg sign  Coordination and gait normal    Skin: Skin is warm and dry  Psychiatric: She has a normal mood and affect  Her behavior is normal    Vitals reviewed      EKG: there are no previous tracings available for comparison, normal sinus rhythm, prolonged QT interval    Ventricular rate: 78 bpm            Pertinent Laboratory/Diagnostic Studies:  Results for orders placed or performed in visit on 04/12/18   HEMOGLOBIN A1C W/ EAG ESTIMATION   Result Value Ref Range    EXT Hemoglobin A1C 6 1        Orders Placed This Encounter   Procedures    CBC and differential    Basic metabolic panel    Hemoglobin A1c (w/out EAG)    TSH, 3rd generation with Free T4 reflex    Ambulatory referral to Cardiology    POCT ECG    Echo complete with contrast if indicated       ALLERGIES:  Allergies   Allergen Reactions    Escitalopram Other (See Comments)     Hallucinations    Zolpidem Anxiety    Augmentin [Amoxicillin-Pot Clavulanate] Vomiting    Latex Other (See Comments)     Swelling, irritation       Current Medications     Current Outpatient Prescriptions   Medication Sig Dispense Refill    albuterol (PROVENTIL HFA,VENTOLIN HFA) 90 mcg/act inhaler Inhale 2 puffs every 6 (six) hours as needed      budesonide-formoterol (SYMBICORT) 80-4 5 MCG/ACT inhaler Inhale 2 puffs daily as needed        hydrochlorothiazide (HYDRODIURIL) 25 mg tablet Take 1 tablet (25 mg total) by mouth daily 30 tablet 5    metFORMIN (GLUCOPHAGE) 500 mg tablet Take 1 tablet (500 mg total) by mouth 2 (two) times a day with meals 60 tablet 5    pantoprazole (PROTONIX) 20 mg tablet Take 1 tablet (20 mg total) by mouth daily 30 tablet 5    VITAMIN E PO Take 1 tablet by mouth daily      azithromycin (ZITHROMAX) 250 mg tablet Take 2 tablets today then 1 tablet daily x 4 days 6 tablet 0    benzonatate (TESSALON PERLES) 100 mg capsule Take 1-2 capsules (100-200 mg total) by mouth 3 (three) times a day as needed for cough 30 capsule 0    docusate sodium (COLACE) 100 mg capsule Take 1 capsule (100 mg total) by mouth 2 (two) times a day for 30 days 60 capsule 0    ondansetron (ZOFRAN) 4 mg tablet Take 1 tablet (4 mg total) by mouth every 8 (eight) hours as needed for nausea or vomiting for up to 7 days 20 tablet 0     No current facility-administered medications for this visit            Erika Paulino GLENN  6/25/2018 6:50 PM  [facility]

## 2018-06-25 NOTE — PATIENT INSTRUCTIONS
Problem List Items Addressed This Visit     Hypertension, essential       Patient reports that she frequently trouble with her blood pressure when she is in the office  Her blood pressure did go as high as 170/90 when I was checking it in the office  She previously shown trouble with white coat hypertension  She reports the blood pressure readings home are usually in the 110/60 range  She will continue her medications as they currently are  We did discuss though that if her blood pressure is consistently running on the low side around 100/60 as it was when she initially got here, she could try cutting back on hydrochlorothiazide a low blood pressure reading could have precipitated her to have fainting episodes  Impaired fasting blood sugar     Currently metformin 500 mg twice daily  We will reassess her A1c given her recent events with labs as ordered today  She was encouraged to follow 6 months to continue to her glucose control  Relevant Medications    metFORMIN (GLUCOPHAGE) 500 mg tablet    Depression       The patient states she never started taking the bupropion that she was previously prescribed in February  She is concerned about having a severe reaction to the medication like she did when she took Lexapro or Ambien  She was encouraged to see a psychologist for talk therapy  She was referred in February but states that she was never called  She does not want me to place another referral for this  We will continue to monitor  Other Visit Diagnoses     Syncope, unspecified syncope type    -  Primary    Relevant Orders    POCT ECG    CBC and differential    Basic metabolic panel    Hemoglobin A1c (w/out EAG)    TSH, 3rd generation with Free T4 reflex    Echo complete with contrast if indicated    Ambulatory referral to Cardiology    Bronchitis        Reviewed this is usually viral  Can try Tessalon Perles and continue with albuterol PRN   Given Z-Da but directed to wait until end of week to see if improving      Relevant Medications    benzonatate (TESSALON PERLES) 100 mg capsule    azithromycin (ZITHROMAX) 250 mg tablet

## 2018-07-26 ENCOUNTER — TELEPHONE (OUTPATIENT)
Dept: FAMILY MEDICINE CLINIC | Facility: CLINIC | Age: 53
End: 2018-07-26

## 2018-08-16 DIAGNOSIS — K21.9 GASTROESOPHAGEAL REFLUX DISEASE WITHOUT ESOPHAGITIS: ICD-10-CM

## 2018-08-16 DIAGNOSIS — I10 HYPERTENSION, ESSENTIAL: ICD-10-CM

## 2018-08-16 RX ORDER — PANTOPRAZOLE SODIUM 20 MG/1
20 TABLET, DELAYED RELEASE ORAL DAILY
Qty: 30 TABLET | Refills: 5 | Status: SHIPPED | OUTPATIENT
Start: 2018-08-16 | End: 2019-02-21 | Stop reason: SDUPTHER

## 2018-08-16 RX ORDER — HYDROCHLOROTHIAZIDE 25 MG/1
25 TABLET ORAL DAILY
Qty: 30 TABLET | Refills: 5 | Status: SHIPPED | OUTPATIENT
Start: 2018-08-16 | End: 2018-11-12 | Stop reason: SDUPTHER

## 2018-11-11 DIAGNOSIS — I10 HYPERTENSION, ESSENTIAL: ICD-10-CM

## 2018-11-12 RX ORDER — HYDROCHLOROTHIAZIDE 25 MG/1
25 TABLET ORAL DAILY
Qty: 30 TABLET | Refills: 1 | Status: SHIPPED | OUTPATIENT
Start: 2018-11-12 | End: 2019-01-11 | Stop reason: SDUPTHER

## 2018-12-21 DIAGNOSIS — R73.01 IMPAIRED FASTING BLOOD SUGAR: ICD-10-CM

## 2019-01-11 DIAGNOSIS — I10 HYPERTENSION, ESSENTIAL: ICD-10-CM

## 2019-01-11 RX ORDER — HYDROCHLOROTHIAZIDE 25 MG/1
25 TABLET ORAL DAILY
Qty: 30 TABLET | Refills: 1 | Status: SHIPPED | OUTPATIENT
Start: 2019-01-11 | End: 2019-03-08 | Stop reason: SDUPTHER

## 2019-02-21 DIAGNOSIS — K21.9 GASTROESOPHAGEAL REFLUX DISEASE WITHOUT ESOPHAGITIS: ICD-10-CM

## 2019-02-21 RX ORDER — PANTOPRAZOLE SODIUM 20 MG/1
TABLET, DELAYED RELEASE ORAL
Qty: 30 TABLET | Refills: 5 | Status: SHIPPED | OUTPATIENT
Start: 2019-02-21 | End: 2019-06-20 | Stop reason: SDUPTHER

## 2019-03-08 DIAGNOSIS — I10 HYPERTENSION, ESSENTIAL: ICD-10-CM

## 2019-03-08 RX ORDER — HYDROCHLOROTHIAZIDE 25 MG/1
25 TABLET ORAL DAILY
Qty: 30 TABLET | Refills: 1 | Status: SHIPPED | OUTPATIENT
Start: 2019-03-08 | End: 2019-06-20 | Stop reason: ALTCHOICE

## 2019-05-21 DIAGNOSIS — R73.01 IMPAIRED FASTING BLOOD SUGAR: ICD-10-CM

## 2019-05-21 DIAGNOSIS — I10 HYPERTENSION, ESSENTIAL: ICD-10-CM

## 2019-05-30 RX ORDER — HYDROCHLOROTHIAZIDE 25 MG/1
25 TABLET ORAL DAILY
Qty: 30 TABLET | Refills: 1 | OUTPATIENT
Start: 2019-05-30

## 2019-06-20 ENCOUNTER — OFFICE VISIT (OUTPATIENT)
Dept: FAMILY MEDICINE CLINIC | Facility: CLINIC | Age: 54
End: 2019-06-20
Payer: COMMERCIAL

## 2019-06-20 VITALS
SYSTOLIC BLOOD PRESSURE: 136 MMHG | DIASTOLIC BLOOD PRESSURE: 80 MMHG | WEIGHT: 189 LBS | HEIGHT: 62 IN | BODY MASS INDEX: 34.78 KG/M2 | TEMPERATURE: 98.6 F | RESPIRATION RATE: 18 BRPM | OXYGEN SATURATION: 98 % | HEART RATE: 90 BPM

## 2019-06-20 DIAGNOSIS — Z00.00 ENCOUNTER FOR PHYSICAL EXAMINATION: Primary | ICD-10-CM

## 2019-06-20 DIAGNOSIS — Z12.11 ENCOUNTER FOR SCREENING FECAL OCCULT BLOOD TESTING: ICD-10-CM

## 2019-06-20 DIAGNOSIS — K21.9 GASTROESOPHAGEAL REFLUX DISEASE WITHOUT ESOPHAGITIS: ICD-10-CM

## 2019-06-20 DIAGNOSIS — F32.A DEPRESSIVE DISORDER: ICD-10-CM

## 2019-06-20 DIAGNOSIS — K21.9 GASTROESOPHAGEAL REFLUX DISEASE, ESOPHAGITIS PRESENCE NOT SPECIFIED: ICD-10-CM

## 2019-06-20 DIAGNOSIS — I10 HYPERTENSION, ESSENTIAL: ICD-10-CM

## 2019-06-20 DIAGNOSIS — J45.20 MILD INTERMITTENT ASTHMA WITHOUT COMPLICATION: ICD-10-CM

## 2019-06-20 DIAGNOSIS — B00.9 HERPES SIMPLEX: ICD-10-CM

## 2019-06-20 DIAGNOSIS — R73.01 IMPAIRED FASTING BLOOD SUGAR: ICD-10-CM

## 2019-06-20 PROBLEM — G47.33 OSA (OBSTRUCTIVE SLEEP APNEA): Status: ACTIVE | Noted: 2019-03-19

## 2019-06-20 PROCEDURE — 99396 PREV VISIT EST AGE 40-64: CPT | Performed by: INTERNAL MEDICINE

## 2019-06-20 RX ORDER — BUPROPION HYDROCHLORIDE 150 MG/1
150 TABLET ORAL DAILY
Qty: 30 TABLET | Refills: 0 | Status: SHIPPED | OUTPATIENT
Start: 2019-06-20 | End: 2019-07-12 | Stop reason: SDUPTHER

## 2019-06-20 RX ORDER — VALACYCLOVIR HYDROCHLORIDE 1 G/1
2000 TABLET, FILM COATED ORAL 2 TIMES DAILY
Qty: 4 TABLET | Refills: 3 | Status: SHIPPED | OUTPATIENT
Start: 2019-06-20 | End: 2019-06-21

## 2019-06-20 RX ORDER — PANTOPRAZOLE SODIUM 20 MG/1
20 TABLET, DELAYED RELEASE ORAL DAILY
Qty: 90 TABLET | Refills: 2 | Status: SHIPPED | OUTPATIENT
Start: 2019-06-20

## 2019-07-12 DIAGNOSIS — F32.A DEPRESSIVE DISORDER: ICD-10-CM

## 2019-07-12 RX ORDER — BUPROPION HYDROCHLORIDE 150 MG/1
TABLET ORAL
Qty: 30 TABLET | Refills: 3 | Status: SHIPPED | OUTPATIENT
Start: 2019-07-12 | End: 2019-11-04 | Stop reason: SDUPTHER

## 2019-09-30 ENCOUNTER — CONSULT (OUTPATIENT)
Dept: ENDOCRINOLOGY | Facility: HOSPITAL | Age: 54
End: 2019-09-30
Payer: COMMERCIAL

## 2019-09-30 VITALS
HEART RATE: 92 BPM | WEIGHT: 187.8 LBS | BODY MASS INDEX: 34.56 KG/M2 | HEIGHT: 62 IN | SYSTOLIC BLOOD PRESSURE: 148 MMHG | DIASTOLIC BLOOD PRESSURE: 102 MMHG

## 2019-09-30 DIAGNOSIS — I10 HYPERTENSION, ESSENTIAL: ICD-10-CM

## 2019-09-30 DIAGNOSIS — R73.01 IMPAIRED FASTING BLOOD SUGAR: Primary | ICD-10-CM

## 2019-09-30 PROCEDURE — 99244 OFF/OP CNSLTJ NEW/EST MOD 40: CPT | Performed by: INTERNAL MEDICINE

## 2019-09-30 RX ORDER — LISINOPRIL 5 MG/1
5 TABLET ORAL DAILY
Qty: 90 TABLET | Refills: 1 | Status: SHIPPED | OUTPATIENT
Start: 2019-09-30

## 2019-09-30 RX ORDER — PSEUDOEPHEDRINE HYDROCHLORIDE 30 MG/1
60 TABLET ORAL EVERY 4 HOURS PRN
COMMUNITY

## 2019-09-30 NOTE — PROGRESS NOTES
Endocrinology - Consultation    ASSESSMENT/PLAN:    Impaired fasting blood glucose/prediabetes  -last A1c 6 1% in February 2018  -repeat A1c  -obtain lipid panel, BMP, CBC, TSH/T4, microalbumin/creatinine ratio  -continue metformin 500 mg b i d  Hypertension  -elevated today 148/102  -currently being evaluated by primary care physician  -previously on hydrochlorothiazide; currently on hold as blood pressure reading at PCP office 140/90  -patient will benefit from antihypertensive medications  Will start lisinopril 5mg daily   -instructed to follow-up with PCP for BP check  History of PCOS  -status post total hysterectomy in 2016    Health Maintenance:  Advised diet and exercise  Advised to refrain from tobacco, alcohol, illicit drug use  Advised medical compliance  Patient is due for her colonoscopy    CHIEF COMPLAINT:  Impaired fasting blood glucose    HISTORY OF PRESENT ILLNESS:    57-year-old female with past medical history of GERD, PCOS (diagnosed over 25 years ago and status post hysterectomy in 2016), obstructive sleep apnea (not on CPAP), asthma, hypertension, depression, herpes simplex presents today for evaluation of impaired blood glucose  She had an A1c of 6 1% in February 2018 and is currently taking metformin 500 mg b i d  (1000 mg daily - recently started over the past few)  Patient reports episodes where she experiences seldom episodes shakiness, diaphoresis that occurred randomly; since been ongoing for many years  Since diagnosis of PCOS over the years patient has been trying to manage impaired blood glucose and pre diabetes  Patient currently is not taking any antihypertensive medications as she would like to try a controlling her blood pressure without any medications  Patient is a former smoker quit in 2002  Patient does take herbal supplements - DIM  Patient has mother and 2 sisters with hypothyroidism  Out of the 2 sisters 1 of the sisters also has insulin-dependent type 2 diabetes  Patient reports polyuria and foaminess to her urine  Patient states that the volume of urine has increased since her hysterectomy but prior to the hysterectomy her volumes have been lower  Her urinary frequency is dependent on the amount of fluid she intakes  Patient denies any excessive weight gain or weight loss however she does report increased fatigue  The following portions of the patient's history were reviewed and updated as appropriate: allergies, current medications, past family history, past medical history, past social history, past surgical history and problem list     Review of Systems:  Patient reports headaches, lightheadedness, night sweats, and dizziness  Denies fever, chills, visual disturbances, sore throat, chest pain, palpitations, SOB, abdominal pain, nausea, vomiting, or trouble urinating/ defecating  OBJECTIVE:  There were no vitals filed for this visit  Physical Exam   Constitutional: She is oriented to person, place, and time  She appears well-developed and well-nourished  No distress  HENT:   Head: Normocephalic and atraumatic  Right Ear: External ear normal    Left Ear: External ear normal    Nose: Nose normal    Eyes: Pupils are equal, round, and reactive to light  Conjunctivae and EOM are normal  Right eye exhibits no discharge  Left eye exhibits no discharge  No scleral icterus  Neck: No JVD present  No tracheal deviation present  No thyromegaly present  Cardiovascular: Normal rate, regular rhythm, normal heart sounds and intact distal pulses  Exam reveals no gallop and no friction rub  No murmur heard  Pulmonary/Chest: Effort normal and breath sounds normal  No stridor  No respiratory distress  She has no wheezes  She has no rales  She exhibits no tenderness  Abdominal: Soft  Bowel sounds are normal  She exhibits no distension  There is no tenderness  There is no rebound and no guarding  Musculoskeletal: She exhibits no edema, tenderness or deformity  Lymphadenopathy:     She has no cervical adenopathy  Neurological: She is alert and oriented to person, place, and time  No cranial nerve deficit or sensory deficit  Skin: Skin is warm and dry  No rash noted  She is not diaphoretic  No erythema  No pallor  Vitals reviewed          Current Outpatient Medications:     albuterol (PROVENTIL HFA,VENTOLIN HFA) 90 mcg/act inhaler, Inhale 2 puffs every 6 (six) hours as needed, Disp: , Rfl:     budesonide-formoterol (SYMBICORT) 80-4 5 MCG/ACT inhaler, Inhale 2 puffs daily as needed  , Disp: , Rfl:     buPROPion (WELLBUTRIN XL) 150 mg 24 hr tablet, TAKE 1 TABLET BY MOUTH EVERY DAY, Disp: 30 tablet, Rfl: 3    metFORMIN (GLUCOPHAGE) 500 mg tablet, Take 1 tablet (500 mg total) by mouth 2 (two) times a day with meals, Disp: 60 tablet, Rfl: 5    ondansetron (ZOFRAN) 4 mg tablet, Take 1 tablet (4 mg total) by mouth every 8 (eight) hours as needed for nausea or vomiting for up to 7 days, Disp: 20 tablet, Rfl: 0    pantoprazole (PROTONIX) 20 mg tablet, Take 1 tablet (20 mg total) by mouth daily, Disp: 90 tablet, Rfl: 2    Prenatal Vit-Fe Fumarate-FA (BRENDA-RUL PRENATAL VITAMINS PO), Take by mouth, Disp: , Rfl:     Probiotic Product (PROBIOTIC-10 PO), Take by mouth, Disp: , Rfl:     valACYclovir (VALTREX) 1,000 mg tablet, Take 2 tablets (2,000 mg total) by mouth 2 (two) times a day for 1 day, Disp: 4 tablet, Rfl: 3    Past Medical History:   Diagnosis Date    Depression     Hiatal hernia     History of gastric ulcer     Hx of hysterectomy     Incisional hernia without obstruction or gangrene     Migraines     PONV (postoperative nausea and vomiting)     Sleep apnea     doesnt use cpap    Wears glasses      Past Surgical History:   Procedure Laterality Date    HYSTERECTOMY      MASS EXCISION      genitalia area- benign    OK LAP, INCISIONAL HERNIA REPAIR,REDUCIBLE N/A 3/14/2018    Procedure: REPAIR HERNIA INCISIONAL LAPAROSCOPIC WITH MESH INSERTION;  Surgeon: Jacquie Quintero MD;  Location: [facility];  Service: General    SINUS SURGERY      sinus and septum deviation correction and adenoids were removed    WISDOM TOOTH EXTRACTION       Social History     Socioeconomic History    Marital status: Single     Spouse name: Not on file    Number of children: Not on file    Years of education: Not on file    Highest education level: Not on file   Occupational History    Not on file   Social Needs    Financial resource strain: Not on file    Food insecurity:     Worry: Not on file     Inability: Not on file    Transportation needs:     Medical: Not on file     Non-medical: Not on file   Tobacco Use    Smoking status: Former Smoker     Types: Cigarettes     Last attempt to quit:      Years since quittin 7    Smokeless tobacco: Never Used   Substance and Sexual Activity    Alcohol use: Yes     Comment: few x year    Drug use: No    Sexual activity: Not on file   Lifestyle    Physical activity:     Days per week: Not on file     Minutes per session: Not on file    Stress: Not on file   Relationships    Social connections:     Talks on phone: Not on file     Gets together: Not on file     Attends [employer] service: Not on file     Active member of club or organization: Not on file     Attends meetings of clubs or organizations: Not on file     Relationship status: Not on file    Intimate partner violence:     Fear of current or ex partner: Not on file     Emotionally abused: Not on file     Physically abused: Not on file     Forced sexual activity: Not on file   Other Topics Concern    Not on file   Social History Narrative    Not on file     Family History   Problem Relation Age of Onset    Hypertension Mother     Stroke Mother     Depression Mother     Anxiety disorder Mother     Glaucoma Mother     Cancer Father         kidney and stomach    Hypertension Father     Depression Father     Anxiety disorder Father     Glaucoma Father    Jovana Silence Diabetes Sister     Anxiety disorder Sister     Heart attack Brother     Hypertension Brother        ==  DO Amy Leon 75 Internal Medicine PGY-3  Endocrinology - [id number]

## 2019-10-05 LAB — HBA1C MFR BLD HPLC: 6 %

## 2019-10-28 ENCOUNTER — TELEPHONE (OUTPATIENT)
Dept: ENDOCRINOLOGY | Facility: HOSPITAL | Age: 54
End: 2019-10-28

## 2019-10-28 NOTE — TELEPHONE ENCOUNTER
Reviewed labs from [address] [place] [address] laboratories done on 10/05/2019:  Glucose 108, BUN 20, creatinine 0 84, sodium 142, calcium 9 5, GFR 80, A1c 6 0, total cholesterol 208, triglycerides 114, , total T3 0 95, TSH 1 87, hemoglobin 14  Please let patient know A1c is stable at 6 0 compared to last reading from 2018  Overall, would continue current plan as discussed that prior appointment based on these labs

## 2019-10-28 NOTE — TELEPHONE ENCOUNTER
Thyroid levels look good in this blood work  Cholesterol numbers are slightly high but do not require medication at this time adjust lifestyle intervention and monitoring over time

## 2019-10-28 NOTE — TELEPHONE ENCOUNTER
Spoke with patient  She would like to know how her thyroid and cholesterol levels came back as well

## 2019-11-04 DIAGNOSIS — F32.A DEPRESSIVE DISORDER: ICD-10-CM

## 2019-11-04 RX ORDER — BUPROPION HYDROCHLORIDE 150 MG/1
TABLET ORAL
Qty: 30 TABLET | Refills: 3 | Status: SHIPPED | OUTPATIENT
Start: 2019-11-04 | End: 2019-12-11 | Stop reason: SDUPTHER

## 2019-12-11 ENCOUNTER — OFFICE VISIT (OUTPATIENT)
Dept: FAMILY MEDICINE CLINIC | Facility: CLINIC | Age: 54
End: 2019-12-11
Payer: COMMERCIAL

## 2019-12-11 VITALS
WEIGHT: 186.6 LBS | DIASTOLIC BLOOD PRESSURE: 90 MMHG | TEMPERATURE: 96.8 F | RESPIRATION RATE: 18 BRPM | HEART RATE: 98 BPM | HEIGHT: 62 IN | SYSTOLIC BLOOD PRESSURE: 160 MMHG | OXYGEN SATURATION: 98 % | BODY MASS INDEX: 34.34 KG/M2

## 2019-12-11 DIAGNOSIS — I10 HYPERTENSION, ESSENTIAL: ICD-10-CM

## 2019-12-11 DIAGNOSIS — F32.A DEPRESSIVE DISORDER: ICD-10-CM

## 2019-12-11 DIAGNOSIS — J01.00 ACUTE NON-RECURRENT MAXILLARY SINUSITIS: Primary | ICD-10-CM

## 2019-12-11 PROCEDURE — 3008F BODY MASS INDEX DOCD: CPT | Performed by: INTERNAL MEDICINE

## 2019-12-11 PROCEDURE — 1036F TOBACCO NON-USER: CPT | Performed by: INTERNAL MEDICINE

## 2019-12-11 PROCEDURE — 99214 OFFICE O/P EST MOD 30 MIN: CPT | Performed by: INTERNAL MEDICINE

## 2019-12-11 RX ORDER — HYDROCHLOROTHIAZIDE 12.5 MG/1
12.5 TABLET ORAL DAILY
Qty: 30 TABLET | Refills: 1 | Status: SHIPPED | OUTPATIENT
Start: 2019-12-11 | End: 2020-02-06

## 2019-12-11 RX ORDER — BUPROPION HYDROCHLORIDE 300 MG/1
300 TABLET ORAL DAILY
Qty: 30 TABLET | Refills: 1 | Status: SHIPPED | OUTPATIENT
Start: 2019-12-11 | End: 2020-02-06

## 2019-12-11 RX ORDER — AZITHROMYCIN 250 MG/1
TABLET, FILM COATED ORAL
Qty: 6 TABLET | Refills: 0 | Status: SHIPPED | OUTPATIENT
Start: 2019-12-11 | End: 2019-12-15

## 2019-12-11 NOTE — PROGRESS NOTES
Assessment/Plan:     Diagnoses and all orders for this visit:    Acute non-recurrent maxillary sinusitis  -     azithromycin (ZITHROMAX) 250 mg tablet; Take 2 tablets today then 1 tablet daily x 4 days  -     budesonide (RINOCORT AQUA) 32 MCG/ACT nasal spray; 1 spray into each nostril daily    Will continue with symptomatic treatment and therapy with azithromycin will also start  Depressive disorder  -     buPROPion (WELLBUTRIN XL) 300 mg 24 hr tablet; Take 1 tablet (300 mg total) by mouth daily    Reports improvement in mood but did think it could improve further  Given a script for 300 mg to switch to  Follow up in 1 month  Hypertension, essential  -     hydrochlorothiazide (HYDRODIURIL) 12 5 mg tablet; Take 1 tablet (12 5 mg total) by mouth daily    Elevated on exam and has been  She wanted to re-start HCTZ  This was sent and needs follow up in 1 month  Refusing mammogram and colon cancer screening  Subjective:      Patient ID: Peng Carnes is a 48 y o  female  Alhaji Villela is here today for a sick visit  Symptoms started on Sunday  Reports sore throat that moved into her sinuses  See below  Reports being around a group of people but nobody in particular at home sick  Was also seen by Dr Arturo Casillas from Endocrinology  He started her on Lisinopril 5 mg  URI    This is a new problem  The current episode started in the past 7 days  The problem has been unchanged  Associated symptoms include congestion, coughing, headaches, nausea, a plugged ear sensation, sinus pain and a sore throat  Pertinent negatives include no abdominal pain, diarrhea, rhinorrhea or vomiting  Treatments tried: OTC cold remedies  The treatment provided mild relief         The following portions of the patient's history were reviewed and updated as appropriate: allergies, current medications, past family history, past medical history, past social history, past surgical history and problem list     Review of Systems   HENT: Positive for congestion, sinus pain and sore throat  Negative for rhinorrhea  Respiratory: Positive for cough  Gastrointestinal: Positive for nausea  Negative for abdominal pain, diarrhea and vomiting  Neurological: Positive for headaches  Objective:      /90   Pulse 98   Temp (!) 96 8 °F (36 °C)   Resp 18   Ht 5' 2" (1 575 m)   Wt 84 6 kg (186 lb 9 6 oz)   SpO2 98%   BMI 34 13 kg/m²          Physical Exam   Constitutional: She is oriented to person, place, and time  She appears well-developed and well-nourished  No distress  HENT:   Head: Normocephalic and atraumatic  Right Ear: External ear normal    Left Ear: External ear normal    Nose: Foreign body is present  Right sinus exhibits maxillary sinus tenderness  Right sinus exhibits no frontal sinus tenderness  Left sinus exhibits maxillary sinus tenderness  Left sinus exhibits no frontal sinus tenderness  Eyes: Conjunctivae and EOM are normal  Right eye exhibits no discharge  Left eye exhibits no discharge  No scleral icterus  Neck: Normal range of motion  Cardiovascular: Normal rate, regular rhythm and normal heart sounds  No murmur heard  Pulmonary/Chest: Effort normal and breath sounds normal  No respiratory distress  She has no wheezes  Musculoskeletal: Normal range of motion  Lymphadenopathy:     She has cervical adenopathy  Neurological: She is alert and oriented to person, place, and time  Skin: Skin is warm and dry  She is not diaphoretic  No erythema  Psychiatric: She has a normal mood and affect  Her speech is normal and behavior is normal  Judgment and thought content normal    Vitals reviewed

## 2020-02-06 DIAGNOSIS — I10 HYPERTENSION, ESSENTIAL: ICD-10-CM

## 2020-02-06 DIAGNOSIS — F32.A DEPRESSIVE DISORDER: ICD-10-CM

## 2020-02-06 RX ORDER — HYDROCHLOROTHIAZIDE 12.5 MG/1
TABLET ORAL
Qty: 30 TABLET | Refills: 0 | Status: SHIPPED | OUTPATIENT
Start: 2020-02-06

## 2020-02-06 RX ORDER — BUPROPION HYDROCHLORIDE 300 MG/1
TABLET ORAL
Qty: 30 TABLET | Refills: 0 | Status: SHIPPED | OUTPATIENT
Start: 2020-02-06 | End: 2020-03-10

## 2020-03-10 DIAGNOSIS — F32.A DEPRESSIVE DISORDER: ICD-10-CM

## 2020-03-10 RX ORDER — BUPROPION HYDROCHLORIDE 300 MG/1
TABLET ORAL
Qty: 30 TABLET | Refills: 0 | Status: SHIPPED | OUTPATIENT
Start: 2020-03-10 | End: 2020-04-13

## 2020-04-13 DIAGNOSIS — F32.A DEPRESSIVE DISORDER: ICD-10-CM

## 2020-04-13 RX ORDER — BUPROPION HYDROCHLORIDE 300 MG/1
TABLET ORAL
Qty: 30 TABLET | Refills: 0 | Status: SHIPPED | OUTPATIENT
Start: 2020-04-13 | End: 2020-05-13

## 2020-04-17 DIAGNOSIS — R73.01 IMPAIRED FASTING BLOOD SUGAR: ICD-10-CM

## 2020-05-13 DIAGNOSIS — F32.A DEPRESSIVE DISORDER: ICD-10-CM

## 2020-05-13 RX ORDER — BUPROPION HYDROCHLORIDE 300 MG/1
TABLET ORAL
Qty: 30 TABLET | Refills: 3 | Status: SHIPPED | OUTPATIENT
Start: 2020-05-13 | End: 2020-09-18

## 2020-09-16 DIAGNOSIS — F32.A DEPRESSIVE DISORDER: ICD-10-CM

## 2020-09-18 RX ORDER — BUPROPION HYDROCHLORIDE 300 MG/1
TABLET ORAL
Qty: 30 TABLET | Refills: 3 | Status: SHIPPED | OUTPATIENT
Start: 2020-09-18 | End: 2021-01-15

## 2021-01-15 DIAGNOSIS — F32.A DEPRESSIVE DISORDER: ICD-10-CM

## 2021-01-15 RX ORDER — BUPROPION HYDROCHLORIDE 300 MG/1
TABLET ORAL
Qty: 30 TABLET | Refills: 3 | Status: SHIPPED | OUTPATIENT
Start: 2021-01-15 | End: 2021-06-17

## 2021-01-15 NOTE — TELEPHONE ENCOUNTER
Last OV  1/13/2020  Future OV  None scheduled     Patient has not been seen in over one year; called patient and left voicemail to call back to schedule appointment

## 2021-05-15 DIAGNOSIS — R73.01 IMPAIRED FASTING BLOOD SUGAR: ICD-10-CM

## 2021-06-17 DIAGNOSIS — F32.A DEPRESSIVE DISORDER: ICD-10-CM

## 2021-06-17 RX ORDER — BUPROPION HYDROCHLORIDE 300 MG/1
TABLET ORAL
Qty: 30 TABLET | Refills: 3 | Status: SHIPPED | OUTPATIENT
Start: 2021-06-17 | End: 2021-10-27 | Stop reason: SDUPTHER

## 2021-06-17 NOTE — TELEPHONE ENCOUNTER
Left vm for the pt to call the office in regards of medication request inform the pt to schedule an appt

## 2021-06-23 ENCOUNTER — OFFICE VISIT (OUTPATIENT)
Dept: FAMILY MEDICINE CLINIC | Facility: CLINIC | Age: 56
End: 2021-06-23

## 2021-06-23 VITALS
OXYGEN SATURATION: 99 % | HEIGHT: 64 IN | SYSTOLIC BLOOD PRESSURE: 180 MMHG | WEIGHT: 176 LBS | DIASTOLIC BLOOD PRESSURE: 100 MMHG | HEART RATE: 88 BPM | BODY MASS INDEX: 30.05 KG/M2 | TEMPERATURE: 97.8 F

## 2021-06-23 DIAGNOSIS — R73.01 IMPAIRED FASTING GLUCOSE: ICD-10-CM

## 2021-06-23 DIAGNOSIS — F32.A DEPRESSIVE DISORDER: ICD-10-CM

## 2021-06-23 DIAGNOSIS — Z23 NEED FOR TDAP VACCINATION: ICD-10-CM

## 2021-06-23 DIAGNOSIS — R73.01 IMPAIRED FASTING BLOOD SUGAR: ICD-10-CM

## 2021-06-23 DIAGNOSIS — Z12.11 COLON CANCER SCREENING: ICD-10-CM

## 2021-06-23 DIAGNOSIS — Z12.31 BREAST CANCER SCREENING BY MAMMOGRAM: Primary | ICD-10-CM

## 2021-06-23 DIAGNOSIS — I10 HYPERTENSION, ESSENTIAL: ICD-10-CM

## 2021-06-23 DIAGNOSIS — Z51.81 MEDICATION MONITORING ENCOUNTER: ICD-10-CM

## 2021-06-23 PROCEDURE — 99213 OFFICE O/P EST LOW 20 MIN: CPT | Performed by: FAMILY MEDICINE

## 2021-06-23 NOTE — PATIENT INSTRUCTIONS
Consider formal counseling    Call lab and ask for price of CMP  - I would like you to have done FASTING  This is to ensure safety with medication to continue to monitor kidney and liver functions  Once you have insurance- certainly schedule PE - and will make appropriate referrals, imaging,      Return for BP check in 1 mo  Bring along cuff for comparison

## 2021-06-23 NOTE — PROGRESS NOTES
FAMILY PRACTICE OFFICE VISIT    NAME: Chasidy Cruz    AGE: 49 y o  SEX: female  : 1965   MRN: [id number]    DATE: 2021  TIME: 1:44 PM    Assessment and Plan    1  Breast cancer screening by mammogram  Pt declines rx for mammo today  Wants to wait on imaging and referrals until insurance  2  Colon cancer screening      3  Need for Tdap vaccination  Declines adacel  4  Impaired fasting glucose  Due for labs  Pt is in agreement to getting CMP - Patient to call for results if he/she does not hear from us      5  Depressive disorder  Stable  To consider counseling  Urge exercise  6  Impaired fasting blood sugar  Metformin    - Comprehensive metabolic panel; Future    7  Medication monitoring encounter  cmp fasting    8   htn  Elevated today  Pt stopped taking rx meds in past as she reports BP was too low  Home readings are much better controlled  Suspect element of white coat htn  Advise limiting sodium and caffeine intake  And return for BP check in 1 month  Return for BP check in 1 mo  Bring along cuff for comparison  Patient Instructions   Consider formal counseling    Call lab and ask for price of CMP  - I would like you to have done FASTING  This is to ensure safety with medication to continue to monitor kidney and liver functions  Once you have insurance- certainly schedule PE - and will make appropriate referrals, imaging,  There are no Patient Instructions on file for this visit  Pt declines covid vaccine  Pt declines referral to colonoscopy and mammogram Rx  PHQ-9 score of 13    Echo  3/19 -   - Normal LV and RV function    - No significant valvular stenosis or regurgitation    Visually Estimated LV Ejection Fraction is:65%     Advise return visit when able to get PE done    Pt is working on getting insurance  Declines testing (ie: labs and studies) until pt has insurance      Chief Complaint     Chief Complaint   Patient presents with    Follow-up History of Present Illness   Angel Gayle is a 49y o -year-old female who presents today for refills on metformin and wellbutrin  Declines PE today due to no insurance  Pt also gets herpes labialis lesions - outbreaks vary - depending on the amount of stress pt is under  Requests refill on valtrex as well  Review of Systems   Review of Systems   Constitutional: Negative for chills and fever  Does yoga  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  Keeps active  Respiratory: Negative for cough, shortness of breath and wheezing  Does not wear cpap   Has ZOHREH while sleeping supine   Cardiovascular: Negative for chest pain and palpitations  Home BP - 90's - to low 100's over 80-90  H/o taking anti-htn in past    Genitourinary:        Pt takes a natural supplement to help with menopausal symptoms  Does get night sweats  H/o total hysterectomy 2016 - large tumor     Psychiatric/Behavioral: Positive for dysphoric mood  Negative for sleep disturbance  Pt is taking wellbutrin - working well for pt  Pt took lexapro in the past but did not feel well on it  Felt effects of serotonin too much  Does not sleep well - ongoing  Lives alone  Pt has a good support group  Does not go for counseling           Active Problem List     Patient Active Problem List   Diagnosis    Hypertension, essential    Impaired fasting blood sugar    Mild intermittent asthma without complication    Depressive disorder    GERD (gastroesophageal reflux disease)    Ventral hernia without obstruction or gangrene    Anemia    H/O: hysterectomy    Hot flashes    Incisional hernia, without obstruction or gangrene    ZOHREH (obstructive sleep apnea)    Encounter for physical examination    Herpes simplex         Past Medical History:  Past Medical History:   Diagnosis Date    Depression     Hiatal hernia     History of gastric ulcer     Hx of hysterectomy     Incisional hernia without obstruction or gangrene     Migraines     PONV (postoperative nausea and vomiting)     Sleep apnea     doesnt use cpap    Wears glasses        Past Surgical History:  Past Surgical History:   Procedure Laterality Date    HYSTERECTOMY      MASS EXCISION      genitalia area- benign    ID LAP, INCISIONAL HERNIA REPAIR,REDUCIBLE N/A 3/14/2018    Procedure: REPAIR HERNIA INCISIONAL LAPAROSCOPIC WITH MESH INSERTION;  Surgeon: Cortez Amanda MD;  Location: [facility];  Service: General    SINUS SURGERY      sinus and septum deviation correction and adenoids were removed    WISDOM TOOTH EXTRACTION         Family History:  Family History   Problem Relation Age of Onset   Wash [employer]er Hypertension Mother     Stroke Mother     Depression Mother     Anxiety disorder Mother     Glaucoma Mother     Cancer Father         kidney and stomach    Hypertension Father     Depression Father     Anxiety disorder Father     Glaucoma Father     Diabetes Sister     Anxiety disorder Sister     Heart attack Brother     Hypertension Brother        Social History:  Social History     Socioeconomic History    Marital status: Single     Spouse name: Not on file    Number of children: Not on file    Years of education: Not on file    Highest education level: Not on file   Occupational History    Not on file   Tobacco Use    Smoking status: Former Smoker     Types: Cigarettes     Quit date:      Years since quittin 4    Smokeless tobacco: Never Used   Substance and Sexual Activity    Alcohol use: Yes     Comment: few x year    Drug use: No    Sexual activity: Not on file   Other Topics Concern    Not on file   Social History Narrative    Not on file     Social Determinants of Health     Financial Resource Strain:     Difficulty of Paying Living Expenses:    Food Insecurity:     Worried About Running Out of Food in the Last Year:     [address] in the Last Year:    Transportation Needs:     Lack of Transportation (Medical):  Lack of Transportation (Non-Medical):    Physical Activity:     Days of Exercise per Week:     Minutes of Exercise per Session:    Stress:     Feeling of Stress :    Social Connections:     Frequency of Communication with Friends and Family:     Frequency of Social Gatherings with Friends and Family:     Attends [employer] Services:     Active Member of Clubs or Organizations:     Attends Club or Organization Meetings:     Marital Status:    Intimate Partner Violence:     Fear of Current or Ex-Partner:     Emotionally Abused:     Physically Abused:     Sexually Abused:        Objective     Vitals:    06/23/21 1331   Pulse: 104   Temp: 97 8 °F (36 6 °C)   SpO2: 99%     Wt Readings from Last 3 Encounters:   06/23/21 79 8 kg (176 lb)   12/11/19 84 6 kg (186 lb 9 6 oz)   09/30/19 85 2 kg (187 lb 12 8 oz)       Physical Exam  Vitals and nursing note reviewed  Constitutional:       General: She is not in acute distress  Appearance: Normal appearance  She is normal weight  She is not ill-appearing or toxic-appearing  Cardiovascular:      Rate and Rhythm: Normal rate and regular rhythm  Pulses: Normal pulses  Heart sounds: Normal heart sounds  No murmur heard  Pulmonary:      Effort: Pulmonary effort is normal  No respiratory distress  Breath sounds: Normal breath sounds  No stridor  No wheezing, rhonchi or rales  Musculoskeletal:      Right lower leg: No edema  Left lower leg: No edema  Neurological:      General: No focal deficit present  Mental Status: She is alert and oriented to person, place, and time  Psychiatric:         Mood and Affect: Mood normal          Behavior: Behavior normal          Thought Content:  Thought content normal          Judgment: Judgment normal          Pertinent Laboratory/Diagnostic Studies:  No results found for: GLUCOSE, BUN, CREATININE, CALCIUM, NA, K, CO2, CL  No results found for: ALT, AST, GGT, ALKPHOS, BILITOT    No results found for: WBC, HGB, HCT, MCV, PLT    No results found for: TSH    No results found for: CHOL  No results found for: TRIG  No results found for: HDL  No results found for: [facility]  Lab Results   Component Value Date    HGBA1C 6 0 10/05/2019       Results for orders placed or performed in visit on 10/05/19   Hemoglobin A1C   Result Value Ref Range    Hemoglobin A1C 6 0        No orders of the defined types were placed in this encounter        ALLERGIES:  Allergies   Allergen Reactions    Escitalopram Other (See Comments)     Hallucinations    Zolpidem Anxiety    Augmentin [Amoxicillin-Pot Clavulanate] Vomiting    Latex Other (See Comments)     Swelling, irritation       Current Medications     Current Outpatient Medications   Medication Sig Dispense Refill    albuterol (PROVENTIL HFA,VENTOLIN HFA) 90 mcg/act inhaler Inhale 2 puffs every 6 (six) hours as needed      budesonide-formoterol (SYMBICORT) 80-4 5 MCG/ACT inhaler Inhale 2 puffs daily as needed        buPROPion (WELLBUTRIN XL) 300 mg 24 hr tablet TAKE 1 TABLET BY MOUTH EVERY DAY 30 tablet 3    metFORMIN (GLUCOPHAGE) 500 mg tablet TAKE 1 TABLET BY MOUTH TWICE A DAY WITH MEALS 180 tablet 1    Probiotic Product (PROBIOTIC-10 PO) Take by mouth      budesonide (RINOCORT AQUA) 32 MCG/ACT nasal spray 1 spray into each nostril daily (Patient not taking: Reported on 6/23/2021) 1 Bottle 0    hydrochlorothiazide (HYDRODIURIL) 12 5 mg tablet TAKE 1 TABLET BY MOUTH EVERY DAY (Patient not taking: Reported on 6/23/2021) 30 tablet 0    lisinopril (ZESTRIL) 5 mg tablet Take 1 tablet (5 mg total) by mouth daily (Patient not taking: Reported on 6/23/2021) 90 tablet 1    pantoprazole (PROTONIX) 20 mg tablet Take 1 tablet (20 mg total) by mouth daily (Patient not taking: Reported on 6/23/2021) 90 tablet 2    Prenatal Vit-Fe Fumarate-FA (BRENDA-RUL PRENATAL VITAMINS PO) Take by mouth (Patient not taking: Reported on 6/23/2021)      pseudoephedrine (SUDAFED) 30 mg tablet Take 60 mg by mouth every 4 (four) hours as needed for congestion (Patient not taking: Reported on 6/23/2021)      valACYclovir (VALTREX) 1,000 mg tablet Take 2 tablets (2,000 mg total) by mouth 2 (two) times a day for 1 day (Patient not taking: Reported on 9/30/2019) 4 tablet 3     No current facility-administered medications for this visit           Health Maintenance     Health Maintenance   Topic Date Due    Hepatitis C Screening  Never done    COVID-19 Vaccine (1) Never done    HIV Screening  Never done    DTaP,Tdap,and Td Vaccines (1 - Tdap) Never done    Colorectal Cancer Screening  Never done    MAMMOGRAM  05/26/2017    BMI: Followup Plan  06/20/2020    Annual Physical  06/20/2020    Influenza Vaccine (Season Ended) 09/01/2021    BMI: Adult  06/23/2022    Depression Remission PHQ  06/23/2022    Pneumococcal Vaccine: Pediatrics (0 to 5 Years) and At-Risk Patients (6 to 59 Years) (2 of 2 - PPSV23) 05/01/2032    HIB Vaccine  Aged Out    Hepatitis B Vaccine  Aged Out    IPV Vaccine  Aged Out    Hepatitis A Vaccine  Aged Out    Meningococcal ACWY Vaccine  Aged Out    HPV Vaccine  Aged Out     Immunization History   Administered Date(s) Administered    Pneumococcal Polysaccharide PPV23 08/04/2016          Mitch Holland DO

## 2021-09-14 ENCOUNTER — TELEPHONE (OUTPATIENT)
Dept: FAMILY MEDICINE CLINIC | Facility: CLINIC | Age: 56
End: 2021-09-14

## 2021-09-14 NOTE — TELEPHONE ENCOUNTER
Please call pt and ask her if she has been checking her BP? It was elevated at last visit with me  Would like her to come in for recheck - unless she is getting acceptable home readings  Thanks!

## 2021-09-14 NOTE — LETTER
September 29, 2021    Reza Guerra  [address] [id number]      Dear Ms Helm:    Please call our office to schedule an appointment for a follow-up visit regarding Blood Pressure readings  Thank you         Sincerely,    [facility]

## 2021-10-25 DIAGNOSIS — F32.A DEPRESSIVE DISORDER: ICD-10-CM

## 2021-10-27 RX ORDER — BUPROPION HYDROCHLORIDE 300 MG/1
300 TABLET ORAL DAILY
Qty: 30 TABLET | Refills: 1 | Status: SHIPPED | OUTPATIENT
Start: 2021-10-27

## 2021-10-27 RX ORDER — BUPROPION HYDROCHLORIDE 300 MG/1
TABLET ORAL
Qty: 30 TABLET | Refills: 3 | OUTPATIENT
Start: 2021-10-27

## (undated) DEVICE — GLOVE INDICATOR PI UNDERGLOVE SZ 7 BLUE

## (undated) DEVICE — [HIGH FLOW INSUFFLATOR,  DO NOT USE IF PACKAGE IS DAMAGED,  KEEP DRY,  KEEP AWAY FROM SUNLIGHT,  PROTECT FROM HEAT AND RADIOACTIVE SOURCES.]: Brand: PNEUMOSURE

## (undated) DEVICE — INTENDED FOR TISSUE SEPARATION, AND OTHER PROCEDURES THAT REQUIRE A SHARP SURGICAL BLADE TO PUNCTURE OR CUT.: Brand: BARD-PARKER SAFETY BLADES SIZE 15, STERILE

## (undated) DEVICE — SCD SEQUENTIAL COMPRESSION COMFORT SLEEVE MEDIUM KNEE LENGTH: Brand: KENDALL SCD

## (undated) DEVICE — COVER ROLL 8 IN X 2 YD STRETCH TAPE

## (undated) DEVICE — ALLENTOWN LAP CHOLE APP PACK: Brand: CARDINAL HEALTH

## (undated) DEVICE — BLUE HEAT SCOPE WARMER

## (undated) DEVICE — NEEDLE SPINAL 22G X 3.5IN  QUINCKE

## (undated) DEVICE — FIXATION SECURE STRAP 5MM W/12 ABSORABLE STRAPS

## (undated) DEVICE — PMI DISPOSABLE PUNCTURE CLOSURE DEVICE / SUTURE GRASPER: Brand: PMI

## (undated) DEVICE — ENDOPOUCH RETRIEVER SPECIMEN RETRIEVAL BAGS: Brand: ENDOPOUCH RETRIEVER

## (undated) DEVICE — UNDYED BRAIDED (POLYGLACTIN 910), SYNTHETIC ABSORBABLE SUTURE: Brand: COATED VICRYL

## (undated) DEVICE — SUT PROLENE 1 CT-1 30 IN 8425H

## (undated) DEVICE — CHLORAPREP HI-LITE 26ML ORANGE

## (undated) DEVICE — ENDOPATH XCEL BLADELESS TROCARS WITH STABILITY SLEEVES: Brand: ENDOPATH XCEL

## (undated) DEVICE — GLOVE SRG LF STRL BGL SKNSNS 6.5 PF

## (undated) DEVICE — SUT PDS II 1 CT-1 27 IN Z347H

## (undated) DEVICE — 3M™ IOBAN™ 2 ANTIMICROBIAL INCISE DRAPE 6648EZ: Brand: IOBAN™ 2

## (undated) DEVICE — SUT GORTEX CV-2 THX-26 2N05B

## (undated) DEVICE — GLOVE INDICATOR PI UNDERGLOVE SZ 6.5 BLUE

## (undated) DEVICE — 3M™ STERI-STRIP™ REINFORCED ADHESIVE SKIN CLOSURES, R1547, 1/2 IN X 4 IN (12 MM X 100 MM), 6 STRIPS/ENVELOPE: Brand: 3M™ STERI-STRIP™

## (undated) DEVICE — 3000CC GUARDIAN II: Brand: GUARDIAN

## (undated) DEVICE — GLOVE SRG LF STRL BGL SKNSNS 7 PF

## (undated) DEVICE — 2963 MEDIPORE SOFT CLOTH TAPE 3 IN X 10 YD 12 RLS/CS: Brand: 3M™ MEDIPORE™

## (undated) DEVICE — SUT MONOCRYL 4-0 PS-2 27 IN Y426H

## (undated) DEVICE — BINDER ABDOMINAL 46-62 IN

## (undated) DEVICE — INTENDED FOR TISSUE SEPARATION, AND OTHER PROCEDURES THAT REQUIRE A SHARP SURGICAL BLADE TO PUNCTURE OR CUT.: Brand: BARD-PARKER SAFETY BLADES SIZE 11, STERILE

## (undated) DEVICE — ENDOPATH XCEL UNIVERSAL TROCAR STABLILITY SLEEVES: Brand: ENDOPATH XCEL

## (undated) DEVICE — SUT PROLENE 0 CT-2 30 IN 8412H

## (undated) DEVICE — IRRIG ENDO FLO TUBING

## (undated) DEVICE — ELECTRODE LAP J HOOK E-Z CLEAN 33CM-0021

## (undated) DEVICE — ADHESIVE SKN CLSR HISTOACRYL FLEX 0.5ML LF

## (undated) DEVICE — SORBAFIX ABSORBABLE FIXATION SYSTEM 30 ABSORBABLE FASTENERS: Brand: SORBAFIX ABSORBABLE FIXATION SYSTEM

## (undated) DEVICE — PLUMEPEN PRO 10FT

## (undated) DEVICE — HARMONIC ACE 5MM DIAMETER SHEARS 36CM SHAFT LENGTH + ADAPTIVE TISSUE TECHNOLOGY FOR USE WITH GENERATOR G11: Brand: HARMONIC ACE